# Patient Record
Sex: FEMALE | Race: BLACK OR AFRICAN AMERICAN | NOT HISPANIC OR LATINO
[De-identification: names, ages, dates, MRNs, and addresses within clinical notes are randomized per-mention and may not be internally consistent; named-entity substitution may affect disease eponyms.]

---

## 2021-04-08 PROBLEM — Z00.00 ENCOUNTER FOR PREVENTIVE HEALTH EXAMINATION: Status: ACTIVE | Noted: 2021-04-08

## 2021-04-10 ENCOUNTER — APPOINTMENT (OUTPATIENT)
Dept: MRI IMAGING | Facility: CLINIC | Age: 56
End: 2021-04-10

## 2021-04-10 ENCOUNTER — OUTPATIENT (OUTPATIENT)
Dept: OUTPATIENT SERVICES | Facility: HOSPITAL | Age: 56
LOS: 1 days | End: 2021-04-10
Payer: MEDICAID

## 2021-04-10 DIAGNOSIS — Z00.8 ENCOUNTER FOR OTHER GENERAL EXAMINATION: ICD-10-CM

## 2021-04-10 PROCEDURE — 70551 MRI BRAIN STEM W/O DYE: CPT

## 2021-04-10 PROCEDURE — 70551 MRI BRAIN STEM W/O DYE: CPT | Mod: 26

## 2021-07-14 ENCOUNTER — EMERGENCY (EMERGENCY)
Facility: HOSPITAL | Age: 56
LOS: 1 days | Discharge: HOME | End: 2021-07-14
Admitting: HOSPITALIST
Payer: MEDICAID

## 2021-07-14 VITALS
DIASTOLIC BLOOD PRESSURE: 61 MMHG | SYSTOLIC BLOOD PRESSURE: 128 MMHG | OXYGEN SATURATION: 100 % | HEART RATE: 69 BPM | RESPIRATION RATE: 14 BRPM

## 2021-07-14 VITALS
SYSTOLIC BLOOD PRESSURE: 144 MMHG | OXYGEN SATURATION: 99 % | HEART RATE: 88 BPM | DIASTOLIC BLOOD PRESSURE: 88 MMHG | RESPIRATION RATE: 16 BRPM | TEMPERATURE: 100 F

## 2021-07-14 LAB
ALBUMIN SERPL ELPH-MCNC: 4.5 G/DL — SIGNIFICANT CHANGE UP (ref 3.3–5)
ALP SERPL-CCNC: 53 U/L — SIGNIFICANT CHANGE UP (ref 40–120)
ALT FLD-CCNC: 22 U/L — SIGNIFICANT CHANGE UP (ref 4–33)
ANION GAP SERPL CALC-SCNC: 11 MMOL/L — SIGNIFICANT CHANGE UP (ref 7–14)
AST SERPL-CCNC: 22 U/L — SIGNIFICANT CHANGE UP (ref 4–32)
BASOPHILS # BLD AUTO: 0.01 K/UL — SIGNIFICANT CHANGE UP (ref 0–0.2)
BASOPHILS NFR BLD AUTO: 0.1 % — SIGNIFICANT CHANGE UP (ref 0–2)
BILIRUB SERPL-MCNC: <0.2 MG/DL — SIGNIFICANT CHANGE UP (ref 0.2–1.2)
BUN SERPL-MCNC: 11 MG/DL — SIGNIFICANT CHANGE UP (ref 7–23)
CALCIUM SERPL-MCNC: 9.5 MG/DL — SIGNIFICANT CHANGE UP (ref 8.4–10.5)
CHLORIDE SERPL-SCNC: 109 MMOL/L — HIGH (ref 98–107)
CO2 SERPL-SCNC: 21 MMOL/L — LOW (ref 22–31)
CREAT SERPL-MCNC: 0.73 MG/DL — SIGNIFICANT CHANGE UP (ref 0.5–1.3)
EOSINOPHIL # BLD AUTO: 0.01 K/UL — SIGNIFICANT CHANGE UP (ref 0–0.5)
EOSINOPHIL NFR BLD AUTO: 0.1 % — SIGNIFICANT CHANGE UP (ref 0–6)
GLUCOSE SERPL-MCNC: 113 MG/DL — HIGH (ref 70–99)
HCT VFR BLD CALC: 39.6 % — SIGNIFICANT CHANGE UP (ref 34.5–45)
HGB BLD-MCNC: 12.7 G/DL — SIGNIFICANT CHANGE UP (ref 11.5–15.5)
IANC: 7.11 K/UL — SIGNIFICANT CHANGE UP (ref 1.5–8.5)
IMM GRANULOCYTES NFR BLD AUTO: 0.3 % — SIGNIFICANT CHANGE UP (ref 0–1.5)
LYMPHOCYTES # BLD AUTO: 1.88 K/UL — SIGNIFICANT CHANGE UP (ref 1–3.3)
LYMPHOCYTES # BLD AUTO: 19.7 % — SIGNIFICANT CHANGE UP (ref 13–44)
MAGNESIUM SERPL-MCNC: 2.1 MG/DL — SIGNIFICANT CHANGE UP (ref 1.6–2.6)
MCHC RBC-ENTMCNC: 27.2 PG — SIGNIFICANT CHANGE UP (ref 27–34)
MCHC RBC-ENTMCNC: 32.1 GM/DL — SIGNIFICANT CHANGE UP (ref 32–36)
MCV RBC AUTO: 84.8 FL — SIGNIFICANT CHANGE UP (ref 80–100)
MONOCYTES # BLD AUTO: 0.49 K/UL — SIGNIFICANT CHANGE UP (ref 0–0.9)
MONOCYTES NFR BLD AUTO: 5.1 % — SIGNIFICANT CHANGE UP (ref 2–14)
NEUTROPHILS # BLD AUTO: 7.11 K/UL — SIGNIFICANT CHANGE UP (ref 1.8–7.4)
NEUTROPHILS NFR BLD AUTO: 74.7 % — SIGNIFICANT CHANGE UP (ref 43–77)
NRBC # BLD: 0 /100 WBCS — SIGNIFICANT CHANGE UP
NRBC # FLD: 0 K/UL — SIGNIFICANT CHANGE UP
PLATELET # BLD AUTO: 199 K/UL — SIGNIFICANT CHANGE UP (ref 150–400)
POTASSIUM SERPL-MCNC: 4 MMOL/L — SIGNIFICANT CHANGE UP (ref 3.5–5.3)
POTASSIUM SERPL-SCNC: 4 MMOL/L — SIGNIFICANT CHANGE UP (ref 3.5–5.3)
PROT SERPL-MCNC: 8.2 G/DL — SIGNIFICANT CHANGE UP (ref 6–8.3)
RBC # BLD: 4.67 M/UL — SIGNIFICANT CHANGE UP (ref 3.8–5.2)
RBC # FLD: 14.3 % — SIGNIFICANT CHANGE UP (ref 10.3–14.5)
SODIUM SERPL-SCNC: 141 MMOL/L — SIGNIFICANT CHANGE UP (ref 135–145)
WBC # BLD: 9.53 K/UL — SIGNIFICANT CHANGE UP (ref 3.8–10.5)
WBC # FLD AUTO: 9.53 K/UL — SIGNIFICANT CHANGE UP (ref 3.8–10.5)

## 2021-07-14 PROCEDURE — 99284 EMERGENCY DEPT VISIT MOD MDM: CPT

## 2021-07-14 RX ORDER — SODIUM CHLORIDE 9 MG/ML
1000 INJECTION INTRAMUSCULAR; INTRAVENOUS; SUBCUTANEOUS ONCE
Refills: 0 | Status: COMPLETED | OUTPATIENT
Start: 2021-07-14 | End: 2021-07-14

## 2021-07-14 RX ORDER — MECLIZINE HCL 12.5 MG
25 TABLET ORAL ONCE
Refills: 0 | Status: COMPLETED | OUTPATIENT
Start: 2021-07-14 | End: 2021-07-14

## 2021-07-14 RX ORDER — METOCLOPRAMIDE HCL 10 MG
10 TABLET ORAL ONCE
Refills: 0 | Status: COMPLETED | OUTPATIENT
Start: 2021-07-14 | End: 2021-07-14

## 2021-07-14 RX ADMIN — Medication 10 MILLIGRAM(S): at 21:46

## 2021-07-14 RX ADMIN — SODIUM CHLORIDE 1000 MILLILITER(S): 9 INJECTION INTRAMUSCULAR; INTRAVENOUS; SUBCUTANEOUS at 21:47

## 2021-07-14 RX ADMIN — Medication 25 MILLIGRAM(S): at 21:48

## 2021-07-14 NOTE — ED PROVIDER NOTE - CLINICAL SUMMARY MEDICAL DECISION MAKING FREE TEXT BOX
55 Y/O F w/ PMH of HTN presents to ER for vertigo.   Lab results within normal limits  Reports improvement fo symptoms following medications  Will refer to Neurology

## 2021-07-14 NOTE — ED PROVIDER NOTE - PATIENT PORTAL LINK FT
You can access the FollowMyHealth Patient Portal offered by [facility] by registering at the following website: [email or web address]. By joining [facility]’s FollowMyHealth portal, you will also be able to view your health information using other applications (apps) compatible with our system.

## 2021-07-14 NOTE — ED PROVIDER NOTE - NS ED ROS FT
Constitutional: (-) fever  Head: Normal cephalic, Atraumatic  Eyes/ENT: (-) vision changes  Cardiovascular: (-) chest pain, (-) wheezing  Respiratory: (-) cough, (-) shortness of breath  Gastrointestinal: (-) vomiting, (-) diarrhea, (-) abdominal pain  : (-) dysuria   Musculoskeletal: (-) back pain  Integumentary: (-) rash, (-) edema  Neurological: (-)loc (+) dizziness  Allergic/Immunologic: (-) pruritus

## 2021-07-14 NOTE — ED ADULT TRIAGE NOTE - CHIEF COMPLAINT QUOTE
Per EMS pt c/o dizziness that worsens with movement and is better when pt is still and her eyes are closed.  Pt with past medical history HTN, vertigo, DM2  by EMS

## 2021-07-14 NOTE — ED PROVIDER NOTE - PHYSICAL EXAMINATION
Vital signs reviewed.   CONSTITUTIONAL: Well-appearing; well-nourished; in no apparent distress. Non-toxic appearing.   HEAD: Normocephalic, atraumatic.  EYES: PERRL, EOM intact, conjunctiva and no sclera injection noted  ENT: normal nose; no rhinorrhea; normal pharynx with no tonsillar hypertrophy, no erythema, no exudate, no lymphadenopathy.  NECK/LYMPH: Supple; non-tender; no cervical lymphadenopathy.  CARD: Normal S1, S2  RESP: Normal chest excursion with respiration; breath sounds clear and equal bilaterally; no wheezes, rhonchi, or rales.  ABD/GI: soft, non-distended; non-tender; no palpable organomegaly, no pulsatile mass.  EXT/MS: moves all extremities; distal pulses are normal, no pedal edema.  SKIN: Normal for age and race; warm; dry; good turgor; no apparent lesions or exudate noted.  NEURO: Awake, alert, oriented x 3, no gross deficits, CN II-XII grossly intact, no motor or sensory deficit noted.  PSYCH: Normal mood; appropriate affect. Vital signs reviewed.   CONSTITUTIONAL: Well-appearing; well-nourished; in no apparent distress. Non-toxic appearing.   HEAD: Normocephalic, atraumatic.  EYES: PERRL, EOM intact, visual fields intact, normal conjunctiva and no sclera injection noted  ENT: normal nose; no rhinorrhea; normal pharynx with no tonsillar hypertrophy, no erythema, no exudate, no lymphadenopathy.  NECK/LYMPH: Supple; non-tender; no cervical lymphadenopathy.  CARD: Normal S1, S2  RESP: Normal chest excursion with respiration; breath sounds clear and equal bilaterally  ABD/GI: soft, non-distended; non-tender  EXT/MS: moves all extremities; distal pulses are normal, no pedal edema.  SKIN: Normal for age and race; warm; dry; good turgor; no apparent lesions or exudate noted.  NEURO: Awake, alert, oriented x 3, no gross deficits, CN II-XII grossly intact, no motor or sensory deficit noted. No pronator drift. Gait intact  PSYCH: Normal mood; appropriate affect.

## 2021-07-14 NOTE — ED PROVIDER NOTE - OBJECTIVE STATEMENT
55 Y/O F w/ PMH of HTN presents to ER for vertigo. Admits to onset of symptoms since 2008 when [facility] left [facility]. No different from previous episodes. States she was at work earlier today when she experienced room spinning and head burning sensation. Felt like she would pass out but symptoms resolved after sitting down. Seen multiple ER's for same complaint, multiple head scans. Last completed MRI of brain in May without acute findings. Prescribed Meclizine without relief. Denies fever, nausea/vomiting, visual/hearing change, chest pain, shortness of breath or syncope.

## 2021-07-15 RX ORDER — METOCLOPRAMIDE HCL 10 MG
1 TABLET ORAL
Qty: 9 | Refills: 0
Start: 2021-07-15 | End: 2021-07-17

## 2023-02-10 ENCOUNTER — TRANSCRIPTION ENCOUNTER (OUTPATIENT)
Age: 58
End: 2023-02-10

## 2023-02-10 ENCOUNTER — INPATIENT (INPATIENT)
Facility: HOSPITAL | Age: 58
LOS: 4 days | Discharge: NURSING, REHAB OR LONG-TERM CARE | End: 2023-02-15
Attending: ORTHOPAEDIC SURGERY | Admitting: ORTHOPAEDIC SURGERY
Payer: MEDICAID

## 2023-02-10 VITALS
TEMPERATURE: 98 F | OXYGEN SATURATION: 100 % | SYSTOLIC BLOOD PRESSURE: 141 MMHG | HEART RATE: 82 BPM | RESPIRATION RATE: 18 BRPM | DIASTOLIC BLOOD PRESSURE: 55 MMHG

## 2023-02-10 DIAGNOSIS — M54.9 DORSALGIA, UNSPECIFIED: ICD-10-CM

## 2023-02-10 LAB
ALBUMIN SERPL ELPH-MCNC: 4.3 G/DL — SIGNIFICANT CHANGE UP (ref 3.3–5)
ALP SERPL-CCNC: 42 U/L — SIGNIFICANT CHANGE UP (ref 40–120)
ALT FLD-CCNC: 20 U/L — SIGNIFICANT CHANGE UP (ref 4–33)
ANION GAP SERPL CALC-SCNC: 9 MMOL/L — SIGNIFICANT CHANGE UP (ref 7–14)
APTT BLD: 28.5 SEC — SIGNIFICANT CHANGE UP (ref 27–36.3)
AST SERPL-CCNC: 23 U/L — SIGNIFICANT CHANGE UP (ref 4–32)
BASOPHILS # BLD AUTO: 0.02 K/UL — SIGNIFICANT CHANGE UP (ref 0–0.2)
BASOPHILS NFR BLD AUTO: 0.3 % — SIGNIFICANT CHANGE UP (ref 0–2)
BILIRUB SERPL-MCNC: <0.2 MG/DL — SIGNIFICANT CHANGE UP (ref 0.2–1.2)
BLD GP AB SCN SERPL QL: NEGATIVE — SIGNIFICANT CHANGE UP
BUN SERPL-MCNC: 16 MG/DL — SIGNIFICANT CHANGE UP (ref 7–23)
CALCIUM SERPL-MCNC: 9.3 MG/DL — SIGNIFICANT CHANGE UP (ref 8.4–10.5)
CHLORIDE SERPL-SCNC: 108 MMOL/L — HIGH (ref 98–107)
CO2 SERPL-SCNC: 23 MMOL/L — SIGNIFICANT CHANGE UP (ref 22–31)
CREAT SERPL-MCNC: 0.73 MG/DL — SIGNIFICANT CHANGE UP (ref 0.5–1.3)
EGFR: 96 ML/MIN/1.73M2 — SIGNIFICANT CHANGE UP
EOSINOPHIL # BLD AUTO: 0.15 K/UL — SIGNIFICANT CHANGE UP (ref 0–0.5)
EOSINOPHIL NFR BLD AUTO: 2.6 % — SIGNIFICANT CHANGE UP (ref 0–6)
GLUCOSE SERPL-MCNC: 140 MG/DL — HIGH (ref 70–99)
HCT VFR BLD CALC: 37.7 % — SIGNIFICANT CHANGE UP (ref 34.5–45)
HGB BLD-MCNC: 11.8 G/DL — SIGNIFICANT CHANGE UP (ref 11.5–15.5)
IANC: 2.57 K/UL — SIGNIFICANT CHANGE UP (ref 1.8–7.4)
IMM GRANULOCYTES NFR BLD AUTO: 0.2 % — SIGNIFICANT CHANGE UP (ref 0–0.9)
INR BLD: 1.06 RATIO — SIGNIFICANT CHANGE UP (ref 0.88–1.16)
LYMPHOCYTES # BLD AUTO: 2.54 K/UL — SIGNIFICANT CHANGE UP (ref 1–3.3)
LYMPHOCYTES # BLD AUTO: 43.9 % — SIGNIFICANT CHANGE UP (ref 13–44)
MCHC RBC-ENTMCNC: 26.2 PG — LOW (ref 27–34)
MCHC RBC-ENTMCNC: 31.3 GM/DL — LOW (ref 32–36)
MCV RBC AUTO: 83.6 FL — SIGNIFICANT CHANGE UP (ref 80–100)
MONOCYTES # BLD AUTO: 0.5 K/UL — SIGNIFICANT CHANGE UP (ref 0–0.9)
MONOCYTES NFR BLD AUTO: 8.6 % — SIGNIFICANT CHANGE UP (ref 2–14)
NEUTROPHILS # BLD AUTO: 2.57 K/UL — SIGNIFICANT CHANGE UP (ref 1.8–7.4)
NEUTROPHILS NFR BLD AUTO: 44.4 % — SIGNIFICANT CHANGE UP (ref 43–77)
NRBC # BLD: 0 /100 WBCS — SIGNIFICANT CHANGE UP (ref 0–0)
NRBC # FLD: 0 K/UL — SIGNIFICANT CHANGE UP (ref 0–0)
PLATELET # BLD AUTO: 185 K/UL — SIGNIFICANT CHANGE UP (ref 150–400)
POTASSIUM SERPL-MCNC: 4.4 MMOL/L — SIGNIFICANT CHANGE UP (ref 3.5–5.3)
POTASSIUM SERPL-SCNC: 4.4 MMOL/L — SIGNIFICANT CHANGE UP (ref 3.5–5.3)
PROT SERPL-MCNC: 7.4 G/DL — SIGNIFICANT CHANGE UP (ref 6–8.3)
PROTHROM AB SERPL-ACNC: 12.3 SEC — SIGNIFICANT CHANGE UP (ref 10.5–13.4)
RBC # BLD: 4.51 M/UL — SIGNIFICANT CHANGE UP (ref 3.8–5.2)
RBC # FLD: 14.2 % — SIGNIFICANT CHANGE UP (ref 10.3–14.5)
RH IG SCN BLD-IMP: POSITIVE — SIGNIFICANT CHANGE UP
RH IG SCN BLD-IMP: POSITIVE — SIGNIFICANT CHANGE UP
SARS-COV-2 RNA SPEC QL NAA+PROBE: SIGNIFICANT CHANGE UP
SODIUM SERPL-SCNC: 140 MMOL/L — SIGNIFICANT CHANGE UP (ref 135–145)
WBC # BLD: 5.79 K/UL — SIGNIFICANT CHANGE UP (ref 3.8–10.5)
WBC # FLD AUTO: 5.79 K/UL — SIGNIFICANT CHANGE UP (ref 3.8–10.5)

## 2023-02-10 PROCEDURE — 99285 EMERGENCY DEPT VISIT HI MDM: CPT

## 2023-02-10 PROCEDURE — 99223 1ST HOSP IP/OBS HIGH 75: CPT

## 2023-02-10 PROCEDURE — 71046 X-RAY EXAM CHEST 2 VIEWS: CPT | Mod: 26

## 2023-02-10 RX ORDER — GABAPENTIN 400 MG/1
100 CAPSULE ORAL THREE TIMES A DAY
Refills: 0 | Status: DISCONTINUED | OUTPATIENT
Start: 2023-02-10 | End: 2023-02-15

## 2023-02-10 RX ORDER — LOSARTAN POTASSIUM 100 MG/1
100 TABLET, FILM COATED ORAL DAILY
Refills: 0 | Status: DISCONTINUED | OUTPATIENT
Start: 2023-02-10 | End: 2023-02-15

## 2023-02-10 RX ORDER — OXYCODONE HYDROCHLORIDE 5 MG/1
5 TABLET ORAL EVERY 4 HOURS
Refills: 0 | Status: DISCONTINUED | OUTPATIENT
Start: 2023-02-10 | End: 2023-02-15

## 2023-02-10 RX ORDER — PANTOPRAZOLE SODIUM 20 MG/1
40 TABLET, DELAYED RELEASE ORAL
Refills: 0 | Status: DISCONTINUED | OUTPATIENT
Start: 2023-02-10 | End: 2023-02-15

## 2023-02-10 RX ORDER — OXYCODONE HYDROCHLORIDE 5 MG/1
10 TABLET ORAL EVERY 4 HOURS
Refills: 0 | Status: DISCONTINUED | OUTPATIENT
Start: 2023-02-10 | End: 2023-02-15

## 2023-02-10 RX ORDER — SODIUM CHLORIDE 9 MG/ML
1000 INJECTION, SOLUTION INTRAVENOUS
Refills: 0 | Status: DISCONTINUED | OUTPATIENT
Start: 2023-02-10 | End: 2023-02-11

## 2023-02-10 RX ORDER — ACETAMINOPHEN 500 MG
975 TABLET ORAL EVERY 8 HOURS
Refills: 0 | Status: DISCONTINUED | OUTPATIENT
Start: 2023-02-10 | End: 2023-02-15

## 2023-02-10 RX ORDER — SODIUM CHLORIDE 9 MG/ML
1000 INJECTION INTRAMUSCULAR; INTRAVENOUS; SUBCUTANEOUS
Refills: 0 | Status: DISCONTINUED | OUTPATIENT
Start: 2023-02-10 | End: 2023-02-10

## 2023-02-10 RX ORDER — ACETAMINOPHEN 500 MG
650 TABLET ORAL ONCE
Refills: 0 | Status: COMPLETED | OUTPATIENT
Start: 2023-02-10 | End: 2023-02-10

## 2023-02-10 RX ADMIN — Medication 975 MILLIGRAM(S): at 15:29

## 2023-02-10 RX ADMIN — GABAPENTIN 100 MILLIGRAM(S): 400 CAPSULE ORAL at 13:14

## 2023-02-10 RX ADMIN — Medication 650 MILLIGRAM(S): at 10:30

## 2023-02-10 RX ADMIN — SODIUM CHLORIDE 75 MILLILITER(S): 9 INJECTION INTRAMUSCULAR; INTRAVENOUS; SUBCUTANEOUS at 12:38

## 2023-02-10 RX ADMIN — OXYCODONE HYDROCHLORIDE 10 MILLIGRAM(S): 5 TABLET ORAL at 21:52

## 2023-02-10 RX ADMIN — Medication 975 MILLIGRAM(S): at 21:21

## 2023-02-10 RX ADMIN — OXYCODONE HYDROCHLORIDE 10 MILLIGRAM(S): 5 TABLET ORAL at 21:21

## 2023-02-10 RX ADMIN — Medication 975 MILLIGRAM(S): at 21:52

## 2023-02-10 RX ADMIN — Medication 975 MILLIGRAM(S): at 13:14

## 2023-02-10 RX ADMIN — GABAPENTIN 100 MILLIGRAM(S): 400 CAPSULE ORAL at 21:22

## 2023-02-10 NOTE — H&P ADULT - ASSESSMENT
A/P: 58y Female with L3-4, 4-5 HND will plan for OR today vs tomorrow.   OR today or tomorrow w/ Dr Funk for L3-5 lami fusion  Pain control  WBAT with assistive devices as needed  NPO  Please obtain medical clearance for OR   A/P: 58y Female with L3-4, 4-5 HND will plan for OR today vs tomorrow.  Patient with severe spinal canal stenosis with spondylolisthesis and now has developed neurogenic bladder for the last few weeks with multiple episodes of loss of urine control.  Patient has neurogenic claudication with radiculopathy with partial weakness.  Goals of surgery discussed to preserve current function prior to further loss with hope of regaining some of the bladder function as well with decompression.  Both patient and her daughter fully understand the severity of her condition and need for surgery.   Patient has been managed by pain management for last 1 year.    OR today or tomorrow w/ Dr Funk for L3-5 lami fusion  Pain control  WBAT with assistive devices as needed  NPO  Please obtain medical clearance for OR

## 2023-02-10 NOTE — CHART NOTE - NSCHARTNOTEFT_GEN_A_CORE
EKG personally reviewed. Notable for sinus bradycardia to 56 with TWI noted in V1-V5. . Compared to previous EKG in 2021 had T-wave inversion previously in V1, V2, V3 and V6. TWI NEW IN V4 AND V5 Spoke with ortho resident, patient without any CP or SOB. Repeat EKG obtained showing stable T wave inversions. No concern for ACS at this time. Patient with no previous cardiac history.     Based on above results. RCRI Class 1 Low Risk, 30 day risk for MACE event 3.9%. Patient medically cleared for procedure.

## 2023-02-10 NOTE — H&P ADULT - ATTENDING COMMENTS
Risks, benefits and alternative have been discussed.  I have updated note to reflect current level of function.  All questions answered.

## 2023-02-10 NOTE — ED ADULT NURSE NOTE - OBJECTIVE STATEMENT
Pt is a 57 y/o female presenting to the ED with c/o lower back pain for the past 7 months. Pt denies any specific event of incident that precipitated the onset of pain. Pt c/o worsening limitations with mobility and ability to complete daily activities. Pt denies any urinary symptoms. Ongoing eval in progress, awaiting further orders.

## 2023-02-10 NOTE — CONSULT NOTE ADULT - ASSESSMENT
57 y/o F with pmhx of GERD, chronic LBP, neuropathy, HTN who presents with worsening lower back pain. Plan for OR with Dr. Funk for L3-L5 laminectomy and fusion.           #Lower Back pain   - worsening back pain for over 7 months, dicussed with ortho team patient had outpatient imaging done and was told to come to ED for possible surgery (not available in [facility] or [facility] per my review)   - plan for OE with L3-L5 laminectomy and fusion with orthopedic surgery   - denies any bowel or urinary incontinence  - CXR personally reviewed with clear lungs, no consolidations or effusions appreciated    - No EKG done in ED, ordered pending results  - medical clearance pending EKG        #Neuropathy   - on gabapentin 100mg TID for neuropathic pain  - would continue      #HTN  - BP within good range  - takes losartan 100mg qd   - would continue     #GERD  - c/w pantoprazole  59 y/o F with pmhx of GERD, chronic LBP, neuropathy, HTN who presents with worsening lower back pain. Plan for OR with Dr. Funk for L3-L5 laminectomy and fusion.       #Lower Back pain   - worsening back pain for over 7 months, dicussed with ortho team patient had outpatient imaging done and was told to come to ED for possible surgery (not available in [facility] or [facility] per my review)   - plan for OR with L3-L5 laminectomy and fusion with orthopedic surgery   - denies any bowel or urinary incontinence  - CXR personally reviewed with clear lungs, no consolidations or effusions appreciated    - No EKG done in ED, ordered pending results  - medical clearance pending EKG        #Neuropathy   - on gabapentin 100mg TID for neuropathic pain  - would continue      #HTN  - BP within good range  - takes losartan 100mg qd   - would continue     #GERD  - c/w pantoprazole

## 2023-02-10 NOTE — H&P ADULT - NSHPLABSRESULTS_GEN_ALL_CORE
Patient: KARINA ESTES  YOB: 1965  imaging:    Lumbar spine MRI and radiographs independently reviewed    EXAM:  MRI LUMBAR SPINE WITHOUT CONTRAST  HISTORY: Lower back pain.  TECHNIQUE: Multiplanar, multi-sequential MRI of the lumbar spine was obtained on a 3T scanner using a standard  protocol.  COMPARISON:  Lumbar spine radiographs 1/19/2023.  FINDINGS:  For purposes of this dictation, the last well-formed disc space will be labeled L5-S1.  OSSEOUS STRUCTURES: Vertebral body heights are preserved. No marrow edema or destructive marrow infiltrative  process.  ALIGNMENT: Normal lumbar lordosis is preserved. Grade 1 anterolisthesis of L4-5.  SPINAL CORD AND CONUS MEDULLARIS: The conus medullaris terminates at L1-2.  PARASPINAL AND INTRA-ABDOMINAL SOFT TISSUES: Unremarkable.  INCLUDED THORACIC SPINE AND SACRUM: Unremarkable.  DISCS: Disc heights are maintained. Disc desiccation from L3-4 to L5-S1.  The following axial levels are imaged and detailed below:  L1-L2: Disc bulge flattens the ventral thecal sac. No spinal canal or neuroforaminal stenosis.  L2-L3: Disc bulge flattens the ventral thecal sac. No spinal canal or neuroforaminal stenosis.  L3-L4: Disc bulge, ligamentum flavum thickening and facet hypertrophy with moderate spinal canal and mild bilateral  neuroforaminal stenosis.   L4-L5: Grade 1 anterolisthesis of L4-5, disc uncovering with annular fissure and facet hypertrophy with severe spinal  canal stenosis and mild bilateral neuroforaminal stenosis.  L5-S1: Disc bulge and facet hypertrophy with mild spinal canal and mild bilateral neuroforaminal stenosis.  IMPRESSION:  MRI of the lumbar spine demonstrates:  Grade 1 anterolisthesis of L4-5.  Multilevel lumbar spondylosis, most pronounced at L4-5, where there is severe spinal canal stenosis and mild bilateral  neuroforaminal stenosis.  Additional findings at each level, as above.  Thank you for the opportunity to participate in the care of this patient.  JOEY GRANGER CHI, MD - Electronically Signed: 02- 9:20 AM  Physician to Physician Direct Line is: [phone]

## 2023-02-10 NOTE — PATIENT PROFILE ADULT - FALL HARM RISK - HARM RISK INTERVENTIONS
Communicate Risk of Fall with Harm to all staff/Reinforce activity limits and safety measures with patient and family/Tailored Fall Risk Interventions/Visual Cue: Yellow wristband and red socks/Bed in lowest position, wheels locked, appropriate side rails in place/Call bell, personal items and telephone in reach/Instruct patient to call for assistance before getting out of bed or chair/Non-slip footwear when patient is out of bed/[place] to call system/Physically safe environment - no spills, clutter or unnecessary equipment/Purposeful Proactive Rounding/Room/bathroom lighting operational, light cord in reach Assistance with ambulation/Assistance OOB with selected safe patient handling equipment/Communicate Risk of Fall with Harm to all staff/Monitor for mental status changes/Monitor gait and stability/Reinforce activity limits and safety measures with patient and family/Reorient to person, place and time as needed/Review medications for side effects contributing to fall risk/Sit up slowly, dangle for a short time, stand at bedside before walking/Tailored Fall Risk Interventions/Toileting schedule using arm’s reach rule for commode and bathroom/Use of alarms - bed, chair and/or voice tab/Visual Cue: Yellow wristband and red socks/Bed in lowest position, wheels locked, appropriate side rails in place/Call bell, personal items and telephone in reach/Instruct patient to call for assistance before getting out of bed or chair/Non-slip footwear when patient is out of bed/[place] to call system/Physically safe environment - no spills, clutter or unnecessary equipment/Purposeful Proactive Rounding/Room/bathroom lighting operational, light cord in reach

## 2023-02-10 NOTE — ED ADULT NURSE NOTE - NSIMPLEMENTINTERV_GEN_ALL_ED
Implemented All Fall Risk Interventions:  [place] to call system. Call bell, personal items and telephone within reach. Instruct patient to call for assistance. Room bathroom lighting operational. Non-slip footwear when patient is off stretcher. Physically safe environment: no spills, clutter or unnecessary equipment. Stretcher in lowest position, wheels locked, appropriate side rails in place. Provide visual cue, wrist band, yellow gown, etc. Monitor gait and stability. Monitor for mental status changes and reorient to person, place, and time. Review medications for side effects contributing to fall risk. Reinforce activity limits and safety measures with patient and family.

## 2023-02-10 NOTE — H&P ADULT - NSHPPHYSICALEXAM_GEN_ALL_CORE
Physical exam  Gen: NAD  Resp: no increased WOB on RA  Spine PE:  Skin intact  No gross deformity  No midnline TTP C/T/L/S spine  No bony step offs  No paraspinal muscle ttp/hypertonicity   Negative clonus  Negative babinski  Negative butterfield  + rectal tone  No saddle anesthesia    Motor:                   C5                C6              C7               C8           T1   R            5/5                5/5            5/5             5/5          5/5  L             5/5               5/5             5/5             5/5          5/5                L2             L3             L4               L5            S1  R         5/5           5/5          5/5             5/5           5/5  L          5/5          5/5           5/5             5/5           5/5    Sensory:            C5         C6         C7      C8       T1        (0=absent, 1=impaired, 2=normal, NT=not testable)  R         2            2           2        2         2  L          2            2           2        2         2               L2          L3         L4      L5       S1         (0=absent, 1=impaired, 2=normal, NT=not testable)  R         2            2            2        2        2  L          2            2           2        2         2    Imaging: Physical exam  Gen: NAD  Resp: no increased WOB on RA  Spine PE:  Skin intact  No gross deformity  No midnline TTP C/T/L/S spine  No bony step offs  No paraspinal muscle ttp/hypertonicity   Negative clonus  Negative babinski  Negative butterfield  + rectal tone  No saddle anesthesia    Motor:                   C5                C6              C7               C8           T1   R            5/5                5/5            5/5             5/5          5/5  L             5/5               5/5             5/5             5/5          5/5                L2             L3             L4               L5            S1  R         5/5           4+/5          4+/5             4+/5           5/5  L          5/5          4+/5           4+/5             4+/5           5/5    * on single leg stance patient is not able to maintain position.  needs support of table, not able to perform toe rise independently without support  (+) SLR bilateral  (+) TTP over lumbar spine    Sensory:            C5         C6         C7      C8       T1        (0=absent, 1=impaired, 2=normal, NT=not testable)  R         2            2           2        2         2  L          2            2           2        2         2               L2          L3         L4      L5       S1         (0=absent, 1=impaired, 2=normal, NT=not testable)  R         2            2           1        1        2  L          2            2           1        1         2    Imaging:    Lumbar spine MRI and radiographs independently reviewed

## 2023-02-10 NOTE — H&P ADULT - HISTORY OF PRESENT ILLNESS
Orthopedic Spine Consult Note    Patient is a 58y Female who presents with 2 years of back pain. Reports pain radiated down legs bilaterally. Also reports 7 mo urinary incontince. Describes feeling urge to urinate but unable to make it to toilet frequently. Denies numbness or paresthesia bialterally. No recent falls or trama. . Denies HS/LOC. Denies pain/injury elsewhere. . Denies bowel incontinence. No saddle anesthesia. Denies fevers/chills. No other complaints at this time.    HEALTH ISSUES - PROBLEM Dx:          MEDICATIONS  (STANDING):  acetaminophen     Tablet .. 650 milliGRAM(s) Oral once      Allergies    No Known Allergies    Intolerances        PAST MEDICAL & SURGICAL HISTORY:  Arthritis    No significant past surgical history                            Vital Signs Last 24 Hrs  T(C): 36.6 (02-10-23 @ 10:22), Max: 36.6 (02-10-23 @ 08:56)  T(F): 97.9 (02-10-23 @ 10:22), Max: 97.9 (02-10-23 @ 08:56)  HR: 82 (02-10-23 @ 10:22) (82 - 82)  BP: 147/76 (02-10-23 @ 10:22) (141/55 - 147/76)  BP(mean): --  RR: 18 (02-10-23 @ 10:22) (18 - 18)  SpO2: 100% (02-10-23 @ 10:22) (100% - 100%)   Orthopedic Spine Consult Note    Patient is a 58y Female who presents with 2 years of back pain. Reports pain radiated down legs bilaterally. Patient had urinary urgency for last few months but in the last two weeks, describes feeling urge to urinate but unable to make it to toilet frequently. Denies numbness or paresthesia bialterally. No recent falls or trama. . Denies HS/LOC. Denies pain/injury elsewhere. . Denies bowel incontinence. No saddle anesthesia. Denies fevers/chills. No other complaints at this time.  Daughter reports patient was in denial and begin to use diaper to avoid incontinence that started in last two weeks.    HEALTH ISSUES - PROBLEM Dx:          MEDICATIONS  (STANDING):  acetaminophen     Tablet .. 650 milliGRAM(s) Oral once      Allergies    No Known Allergies    Intolerances        PAST MEDICAL & SURGICAL HISTORY:  Arthritis    No significant past surgical history                            Vital Signs Last 24 Hrs  T(C): 36.6 (02-10-23 @ 10:22), Max: 36.6 (02-10-23 @ 08:56)  T(F): 97.9 (02-10-23 @ 10:22), Max: 97.9 (02-10-23 @ 08:56)  HR: 82 (02-10-23 @ 10:22) (82 - 82)  BP: 147/76 (02-10-23 @ 10:22) (141/55 - 147/76)  BP(mean): --  RR: 18 (02-10-23 @ 10:22) (18 - 18)  SpO2: 100% (02-10-23 @ 10:22) (100% - 100%)

## 2023-02-10 NOTE — ED ADULT TRIAGE NOTE - CHIEF COMPLAINT QUOTE
Pt states has been having middle lower back  pain  x 7 months. pt also states has been having frequent urination since October.  Pt states seen by doctor and told there where no abnormal findings.  Pt denies fever or chills.

## 2023-02-10 NOTE — ED PROVIDER NOTE - PHYSICAL EXAMINATION
CONSTITUTIONAL: Non-toxic, non-diaphoretic, in no apparent distress  HEAD: Normocephalic; atraumatic  EYES: EOM intact   ENMT: External appears normal; normal oropharynx, moist  NECK: grossly normal active ROM,  CARD: No cyanosis, good peripheral perfusion,   RESP: Normal chest excursion with respiration; no increased work of breathing  ABD: non-distended   EXT: moving all extremities, no gross disfigurement or asymmetry,  SKIN: Warm, dry, no rash  NEURO:  moving all extremities, no facial droop, no dysarthria      cn2-12 intact  5/5 all extremities

## 2023-02-10 NOTE — ED PROVIDER NOTE - OBJECTIVE STATEMENT
Patient is a 58-year-old female past medical history of hypertension is presenting to the emergency department for chronic back pain.  Has been going on for months.  Patient is well-known to the orthopedic team.  Plan for admission is for lumbar spinal surgery.

## 2023-02-10 NOTE — CHART NOTE - NSCHARTNOTEFT_GEN_A_CORE
Case d/w orthopedics resident.  Pt with spinal stenosis now with worsening low back pain and urinary incontinence with plan for lumbar lami/fusion tomorrow AM.  Pt was noted on admission EKG to have TWI in anterior leads, however no chest pain or other concerning symptoms.  Original and repeat EKG's reviewed and are unchanged.      No evidence of acute cardiac ischemia.  No further testing is indicated prior to OR.

## 2023-02-10 NOTE — ED PROVIDER NOTE - CLINICAL SUMMARY MEDICAL DECISION MAKING FREE TEXT BOX
Patient is a 58-year-old female past medical history of hypertension is presenting to the emergency department for chronic back pain.  Has been going on for months.  Patient is well-known to the orthopedic team.  Plan for admission is for lumbar spinal surgery.  Patient is well-appearing, vital signs are within normal limits.  Patient has 5 out of 5 strength in bilateral lower extremities.  Ambulatory with antalgic gait.  Will obtain preoperative screening labs Ortho is already aware and consulted for the patient and plan for admission for OR tonight or tomorrow.  The patient's condition was not amenable to outpatient treatment due either the lack of feasibility of outpatient care coordination, possibility for further decompensation with adverse outcome if discharge, or treatments and diagnostic  modalities only available during an inpatient hospitalization.

## 2023-02-11 ENCOUNTER — TRANSCRIPTION ENCOUNTER (OUTPATIENT)
Age: 58
End: 2023-02-11

## 2023-02-11 DIAGNOSIS — K21.9 GASTRO-ESOPHAGEAL REFLUX DISEASE WITHOUT ESOPHAGITIS: ICD-10-CM

## 2023-02-11 DIAGNOSIS — M54.50 LOW BACK PAIN, UNSPECIFIED: ICD-10-CM

## 2023-02-11 DIAGNOSIS — M48.061 SPINAL STENOSIS, LUMBAR REGION WITHOUT NEUROGENIC CLAUDICATION: ICD-10-CM

## 2023-02-11 DIAGNOSIS — I10 ESSENTIAL (PRIMARY) HYPERTENSION: ICD-10-CM

## 2023-02-11 DIAGNOSIS — G62.9 POLYNEUROPATHY, UNSPECIFIED: ICD-10-CM

## 2023-02-11 LAB
ANION GAP SERPL CALC-SCNC: 10 MMOL/L — SIGNIFICANT CHANGE UP (ref 7–14)
ANION GAP SERPL CALC-SCNC: 10 MMOL/L — SIGNIFICANT CHANGE UP (ref 7–14)
APTT BLD: 28.8 SEC — SIGNIFICANT CHANGE UP (ref 27–36.3)
BASOPHILS # BLD AUTO: 0.01 K/UL — SIGNIFICANT CHANGE UP (ref 0–0.2)
BASOPHILS # BLD AUTO: 0.01 K/UL — SIGNIFICANT CHANGE UP (ref 0–0.2)
BASOPHILS NFR BLD AUTO: 0.1 % — SIGNIFICANT CHANGE UP (ref 0–2)
BASOPHILS NFR BLD AUTO: 0.2 % — SIGNIFICANT CHANGE UP (ref 0–2)
BUN SERPL-MCNC: 15 MG/DL — SIGNIFICANT CHANGE UP (ref 7–23)
BUN SERPL-MCNC: 16 MG/DL — SIGNIFICANT CHANGE UP (ref 7–23)
CALCIUM SERPL-MCNC: 8.9 MG/DL — SIGNIFICANT CHANGE UP (ref 8.4–10.5)
CALCIUM SERPL-MCNC: 9.4 MG/DL — SIGNIFICANT CHANGE UP (ref 8.4–10.5)
CHLORIDE SERPL-SCNC: 105 MMOL/L — SIGNIFICANT CHANGE UP (ref 98–107)
CHLORIDE SERPL-SCNC: 106 MMOL/L — SIGNIFICANT CHANGE UP (ref 98–107)
CO2 SERPL-SCNC: 21 MMOL/L — LOW (ref 22–31)
CO2 SERPL-SCNC: 23 MMOL/L — SIGNIFICANT CHANGE UP (ref 22–31)
CREAT SERPL-MCNC: 0.6 MG/DL — SIGNIFICANT CHANGE UP (ref 0.5–1.3)
CREAT SERPL-MCNC: 0.81 MG/DL — SIGNIFICANT CHANGE UP (ref 0.5–1.3)
EGFR: 105 ML/MIN/1.73M2 — SIGNIFICANT CHANGE UP
EGFR: 85 ML/MIN/1.73M2 — SIGNIFICANT CHANGE UP
EOSINOPHIL # BLD AUTO: 0 K/UL — SIGNIFICANT CHANGE UP (ref 0–0.5)
EOSINOPHIL # BLD AUTO: 0.25 K/UL — SIGNIFICANT CHANGE UP (ref 0–0.5)
EOSINOPHIL NFR BLD AUTO: 0 % — SIGNIFICANT CHANGE UP (ref 0–6)
EOSINOPHIL NFR BLD AUTO: 4.8 % — SIGNIFICANT CHANGE UP (ref 0–6)
GAS PNL BLDA: SIGNIFICANT CHANGE UP
GLUCOSE SERPL-MCNC: 177 MG/DL — HIGH (ref 70–99)
GLUCOSE SERPL-MCNC: 99 MG/DL — SIGNIFICANT CHANGE UP (ref 70–99)
HCG SERPL-ACNC: <5 MIU/ML — SIGNIFICANT CHANGE UP
HCT VFR BLD CALC: 33.4 % — LOW (ref 34.5–45)
HCT VFR BLD CALC: 37.3 % — SIGNIFICANT CHANGE UP (ref 34.5–45)
HGB BLD-MCNC: 10.8 G/DL — LOW (ref 11.5–15.5)
HGB BLD-MCNC: 11.5 G/DL — SIGNIFICANT CHANGE UP (ref 11.5–15.5)
IANC: 1.85 K/UL — SIGNIFICANT CHANGE UP (ref 1.8–7.4)
IANC: 6.9 K/UL — SIGNIFICANT CHANGE UP (ref 1.8–7.4)
IMM GRANULOCYTES NFR BLD AUTO: 0.2 % — SIGNIFICANT CHANGE UP (ref 0–0.9)
IMM GRANULOCYTES NFR BLD AUTO: 0.4 % — SIGNIFICANT CHANGE UP (ref 0–0.9)
INR BLD: 1.1 RATIO — SIGNIFICANT CHANGE UP (ref 0.88–1.16)
LYMPHOCYTES # BLD AUTO: 1.15 K/UL — SIGNIFICANT CHANGE UP (ref 1–3.3)
LYMPHOCYTES # BLD AUTO: 14.1 % — SIGNIFICANT CHANGE UP (ref 13–44)
LYMPHOCYTES # BLD AUTO: 2.62 K/UL — SIGNIFICANT CHANGE UP (ref 1–3.3)
LYMPHOCYTES # BLD AUTO: 49.9 % — HIGH (ref 13–44)
MCHC RBC-ENTMCNC: 26 PG — LOW (ref 27–34)
MCHC RBC-ENTMCNC: 27.1 PG — SIGNIFICANT CHANGE UP (ref 27–34)
MCHC RBC-ENTMCNC: 30.8 GM/DL — LOW (ref 32–36)
MCHC RBC-ENTMCNC: 32.3 GM/DL — SIGNIFICANT CHANGE UP (ref 32–36)
MCV RBC AUTO: 83.9 FL — SIGNIFICANT CHANGE UP (ref 80–100)
MCV RBC AUTO: 84.2 FL — SIGNIFICANT CHANGE UP (ref 80–100)
MONOCYTES # BLD AUTO: 0.09 K/UL — SIGNIFICANT CHANGE UP (ref 0–0.9)
MONOCYTES # BLD AUTO: 0.51 K/UL — SIGNIFICANT CHANGE UP (ref 0–0.9)
MONOCYTES NFR BLD AUTO: 1.1 % — LOW (ref 2–14)
MONOCYTES NFR BLD AUTO: 9.7 % — SIGNIFICANT CHANGE UP (ref 2–14)
NEUTROPHILS # BLD AUTO: 1.85 K/UL — SIGNIFICANT CHANGE UP (ref 1.8–7.4)
NEUTROPHILS # BLD AUTO: 6.9 K/UL — SIGNIFICANT CHANGE UP (ref 1.8–7.4)
NEUTROPHILS NFR BLD AUTO: 35.2 % — LOW (ref 43–77)
NEUTROPHILS NFR BLD AUTO: 84.3 % — HIGH (ref 43–77)
NRBC # BLD: 0 /100 WBCS — SIGNIFICANT CHANGE UP (ref 0–0)
NRBC # BLD: 0 /100 WBCS — SIGNIFICANT CHANGE UP (ref 0–0)
NRBC # FLD: 0 K/UL — SIGNIFICANT CHANGE UP (ref 0–0)
NRBC # FLD: 0 K/UL — SIGNIFICANT CHANGE UP (ref 0–0)
PLATELET # BLD AUTO: 165 K/UL — SIGNIFICANT CHANGE UP (ref 150–400)
PLATELET # BLD AUTO: 182 K/UL — SIGNIFICANT CHANGE UP (ref 150–400)
POTASSIUM SERPL-MCNC: 3.8 MMOL/L — SIGNIFICANT CHANGE UP (ref 3.5–5.3)
POTASSIUM SERPL-MCNC: 3.9 MMOL/L — SIGNIFICANT CHANGE UP (ref 3.5–5.3)
POTASSIUM SERPL-SCNC: 3.8 MMOL/L — SIGNIFICANT CHANGE UP (ref 3.5–5.3)
POTASSIUM SERPL-SCNC: 3.9 MMOL/L — SIGNIFICANT CHANGE UP (ref 3.5–5.3)
PROTHROM AB SERPL-ACNC: 12.8 SEC — SIGNIFICANT CHANGE UP (ref 10.5–13.4)
RBC # BLD: 3.98 M/UL — SIGNIFICANT CHANGE UP (ref 3.8–5.2)
RBC # BLD: 4.43 M/UL — SIGNIFICANT CHANGE UP (ref 3.8–5.2)
RBC # FLD: 14.4 % — SIGNIFICANT CHANGE UP (ref 10.3–14.5)
RBC # FLD: 14.5 % — SIGNIFICANT CHANGE UP (ref 10.3–14.5)
SODIUM SERPL-SCNC: 136 MMOL/L — SIGNIFICANT CHANGE UP (ref 135–145)
SODIUM SERPL-SCNC: 139 MMOL/L — SIGNIFICANT CHANGE UP (ref 135–145)
WBC # BLD: 5.25 K/UL — SIGNIFICANT CHANGE UP (ref 3.8–10.5)
WBC # BLD: 8.18 K/UL — SIGNIFICANT CHANGE UP (ref 3.8–10.5)
WBC # FLD AUTO: 5.25 K/UL — SIGNIFICANT CHANGE UP (ref 3.8–10.5)
WBC # FLD AUTO: 8.18 K/UL — SIGNIFICANT CHANGE UP (ref 3.8–10.5)

## 2023-02-11 PROCEDURE — 88304 TISSUE EXAM BY PATHOLOGIST: CPT | Mod: 26

## 2023-02-11 DEVICE — ROD 70MM: Type: IMPLANTABLE DEVICE | Status: FUNCTIONAL

## 2023-02-11 DEVICE — SCREW SERRATO 5.5X45MM: Type: IMPLANTABLE DEVICE | Status: FUNCTIONAL

## 2023-02-11 DEVICE — GRAFT BONE INFUSE KIT MED: Type: IMPLANTABLE DEVICE | Status: FUNCTIONAL

## 2023-02-11 DEVICE — IMPLANTABLE DEVICE: Type: IMPLANTABLE DEVICE | Status: FUNCTIONAL

## 2023-02-11 DEVICE — BONE WAX 2.5GM: Type: IMPLANTABLE DEVICE | Status: FUNCTIONAL

## 2023-02-11 DEVICE — GRAFT BONE VITOSS BIMODAL 5CC: Type: IMPLANTABLE DEVICE | Status: FUNCTIONAL

## 2023-02-11 DEVICE — GRAFT VITOSIS BIMODAL 10CC: Type: IMPLANTABLE DEVICE | Status: FUNCTIONAL

## 2023-02-11 DEVICE — SURGIFLO MATRIX WITH THROMBIN KIT: Type: IMPLANTABLE DEVICE | Status: FUNCTIONAL

## 2023-02-11 DEVICE — SCREW BLOCKER BONE XIA: Type: IMPLANTABLE DEVICE | Status: FUNCTIONAL

## 2023-02-11 DEVICE — SURGIFOAM PAD 8CM X 12.5CM X 2MM (100C): Type: IMPLANTABLE DEVICE | Status: FUNCTIONAL

## 2023-02-11 RX ORDER — OXYCODONE HYDROCHLORIDE 5 MG/1
5 TABLET ORAL EVERY 4 HOURS
Refills: 0 | Status: DISCONTINUED | OUTPATIENT
Start: 2023-02-11 | End: 2023-02-12

## 2023-02-11 RX ORDER — ONDANSETRON 8 MG/1
4 TABLET, FILM COATED ORAL ONCE
Refills: 0 | Status: DISCONTINUED | OUTPATIENT
Start: 2023-02-11 | End: 2023-02-11

## 2023-02-11 RX ORDER — FENTANYL CITRATE 50 UG/ML
25 INJECTION INTRAVENOUS
Refills: 0 | Status: DISCONTINUED | OUTPATIENT
Start: 2023-02-11 | End: 2023-02-11

## 2023-02-11 RX ORDER — DIAZEPAM 5 MG
5 TABLET ORAL ONCE
Refills: 0 | Status: DISCONTINUED | OUTPATIENT
Start: 2023-02-11 | End: 2023-02-11

## 2023-02-11 RX ORDER — CEFAZOLIN SODIUM 1 G
2000 VIAL (EA) INJECTION EVERY 8 HOURS
Refills: 0 | Status: COMPLETED | OUTPATIENT
Start: 2023-02-11 | End: 2023-02-12

## 2023-02-11 RX ORDER — HYDROMORPHONE HYDROCHLORIDE 2 MG/ML
0.5 INJECTION INTRAMUSCULAR; INTRAVENOUS; SUBCUTANEOUS
Refills: 0 | Status: DISCONTINUED | OUTPATIENT
Start: 2023-02-11 | End: 2023-02-12

## 2023-02-11 RX ORDER — TRAMADOL HYDROCHLORIDE 50 MG/1
50 TABLET ORAL EVERY 6 HOURS
Refills: 0 | Status: DISCONTINUED | OUTPATIENT
Start: 2023-02-11 | End: 2023-02-15

## 2023-02-11 RX ORDER — ACETAMINOPHEN 500 MG
1000 TABLET ORAL ONCE
Refills: 0 | Status: DISCONTINUED | OUTPATIENT
Start: 2023-02-11 | End: 2023-02-12

## 2023-02-11 RX ORDER — OXYCODONE HYDROCHLORIDE 5 MG/1
10 TABLET ORAL EVERY 4 HOURS
Refills: 0 | Status: DISCONTINUED | OUTPATIENT
Start: 2023-02-11 | End: 2023-02-12

## 2023-02-11 RX ORDER — HYDROMORPHONE HYDROCHLORIDE 2 MG/ML
0.5 INJECTION INTRAMUSCULAR; INTRAVENOUS; SUBCUTANEOUS EVERY 8 HOURS
Refills: 0 | Status: DISCONTINUED | OUTPATIENT
Start: 2023-02-11 | End: 2023-02-15

## 2023-02-11 RX ADMIN — Medication 975 MILLIGRAM(S): at 05:44

## 2023-02-11 RX ADMIN — HYDROMORPHONE HYDROCHLORIDE 0.5 MILLIGRAM(S): 2 INJECTION INTRAMUSCULAR; INTRAVENOUS; SUBCUTANEOUS at 16:30

## 2023-02-11 RX ADMIN — GABAPENTIN 100 MILLIGRAM(S): 400 CAPSULE ORAL at 05:44

## 2023-02-11 RX ADMIN — LOSARTAN POTASSIUM 100 MILLIGRAM(S): 100 TABLET, FILM COATED ORAL at 05:44

## 2023-02-11 RX ADMIN — HYDROMORPHONE HYDROCHLORIDE 0.5 MILLIGRAM(S): 2 INJECTION INTRAMUSCULAR; INTRAVENOUS; SUBCUTANEOUS at 15:36

## 2023-02-11 RX ADMIN — Medication 5 MILLIGRAM(S): at 16:22

## 2023-02-11 RX ADMIN — OXYCODONE HYDROCHLORIDE 10 MILLIGRAM(S): 5 TABLET ORAL at 18:27

## 2023-02-11 RX ADMIN — Medication 975 MILLIGRAM(S): at 06:44

## 2023-02-11 RX ADMIN — HYDROMORPHONE HYDROCHLORIDE 0.5 MILLIGRAM(S): 2 INJECTION INTRAMUSCULAR; INTRAVENOUS; SUBCUTANEOUS at 15:56

## 2023-02-11 RX ADMIN — Medication 100 MILLIGRAM(S): at 21:36

## 2023-02-11 RX ADMIN — Medication 975 MILLIGRAM(S): at 22:26

## 2023-02-11 RX ADMIN — HYDROMORPHONE HYDROCHLORIDE 0.5 MILLIGRAM(S): 2 INJECTION INTRAMUSCULAR; INTRAVENOUS; SUBCUTANEOUS at 18:15

## 2023-02-11 RX ADMIN — SODIUM CHLORIDE 75 MILLILITER(S): 9 INJECTION, SOLUTION INTRAVENOUS at 21:00

## 2023-02-11 RX ADMIN — HYDROMORPHONE HYDROCHLORIDE 0.5 MILLIGRAM(S): 2 INJECTION INTRAMUSCULAR; INTRAVENOUS; SUBCUTANEOUS at 15:50

## 2023-02-11 RX ADMIN — OXYCODONE HYDROCHLORIDE 10 MILLIGRAM(S): 5 TABLET ORAL at 19:00

## 2023-02-11 RX ADMIN — Medication 975 MILLIGRAM(S): at 21:33

## 2023-02-11 RX ADMIN — GABAPENTIN 100 MILLIGRAM(S): 400 CAPSULE ORAL at 21:34

## 2023-02-11 RX ADMIN — HYDROMORPHONE HYDROCHLORIDE 0.5 MILLIGRAM(S): 2 INJECTION INTRAMUSCULAR; INTRAVENOUS; SUBCUTANEOUS at 17:56

## 2023-02-11 RX ADMIN — PANTOPRAZOLE SODIUM 40 MILLIGRAM(S): 20 TABLET, DELAYED RELEASE ORAL at 05:45

## 2023-02-11 NOTE — BRIEF OPERATIVE NOTE - NSICDXBRIEFPROCEDURE_GEN_ALL_CORE_FT
PROCEDURES:  Fusion, spine, lumbar, interbody, 1-2 levels, transforaminal approach 11-Feb-2023 15:15:08  Teto Kirk

## 2023-02-11 NOTE — PRE-OP CHECKLIST - SELECT TESTS ORDERED
BMP/CBC/PT/PTT/INR/Type and Screen GLU- 99/BMP/CBC/PT/PTT/INR/Type and Cross/Type and Screen/POCT Blood Glucose/COVID-19

## 2023-02-11 NOTE — PROGRESS NOTE ADULT - SUBJECTIVE AND OBJECTIVE BOX
ORTHO PROGRESS NOTE     No acute overnight events. Pt resting comfortably without complaint. Pain controlled       Vital Signs Last 24 Hrs  T(C): 36.9 (11 Feb 2023 06:02), Max: 36.9 (10 Feb 2023 12:00)  T(F): 98.5 (11 Feb 2023 06:02), Max: 98.5 (10 Feb 2023 12:00)  HR: 62 (11 Feb 2023 06:02) (62 - 105)  BP: 132/70 (11 Feb 2023 06:02) (132/70 - 160/78)  BP(mean): --  RR: 16 (11 Feb 2023 06:02) (16 - 18)  SpO2: 99% (11 Feb 2023 06:02) (99% - 100%)    Parameters below as of 11 Feb 2023 06:02  Patient On (Oxygen Delivery Method): room air        Back:   Motor:                   C5                C6              C7               C8           T1   R            5/5                5/5            5/5             5/5          5/5  L             5/5               5/5             5/5             5/5          5/5                L2             L3             L4               L5            S1  R         5/5           5/5          5/5             5/5           5/5  L          5/5          5/5           5/5             5/5           5/5    Sensory:            C5         C6         C7      C8       T1        (0=absent, 1=impaired, 2=normal, NT=not testable)  R         2            2           2        2         2  L          2            2           2        2         2               L2          L3         L4      L5       S1         (0=absent, 1=impaired, 2=normal, NT=not testable)  R         2            2            2        2        2  L          2            2           2        2         2      A/P: 58y Female with L3-4, 4-5 HND will plan for OR today    OR today  w/ Dr Funk for L3-5 lami fusion  Pain control  WBAT with assistive devices as needed  NPO  Appreciate medical clearance

## 2023-02-11 NOTE — BRIEF OPERATIVE NOTE - NSICDXBRIEFPREOP_GEN_ALL_CORE_FT
PRE-OP DIAGNOSIS:  Lumbar stenosis without neurogenic claudication 11-Feb-2023 15:15:36  Teto Kirk

## 2023-02-11 NOTE — BRIEF OPERATIVE NOTE - NSICDXBRIEFPOSTOP_GEN_ALL_CORE_FT
POST-OP DIAGNOSIS:  Lumbar stenosis without neurogenic claudication 11-Feb-2023 15:15:52  Teto Kirk

## 2023-02-11 NOTE — PROGRESS NOTE ADULT - SUBJECTIVE AND OBJECTIVE BOX
Patient Resting without complaints s/p l3-5 lami/fusion.  No Chest Pain, SOB, N/V.     Exam:   Gen: Alert/Oriented, No Acute Distress  Pulm: Non-labored breathing  BACK:          Dressing: [x] clean/dry/intact  [ ] Other:           Drains: : CHACHO SS         Sensation: [x] intact to light touch  [ ] decreased:          Motor exam: [  ]               [ ] Lower extremeity            PF          DF         EHL       FHL                                                                                            R        5/5        5/5        5/5       5/5                                                        L         5/5        5/5        5/5       5/5                                                                  Calves Soft/Non-tender bilaterally           WWP; capillary refill <3 seconds              LABS:                        10.8<L>  8.18  )-----------( 165      ( 11 Feb 2023 16:21 )             33.4<L>    02-11    136  |  105  |  15  ----------------------------<  177<H>  3.9   |  21<L>  |  0.60      Assessment and Plan:  Pt is a  59 y/o  Female  s/p L3-5 lami/fusion. Patient is hemodynamically stable; recovering well from orhtopaedic standpoint.    -    Multimodal Pain control  -    mechanical DVT ppx  -    Resumed home meds  -    Check AM labs  -    Monitor CHACHO output  -    Weight bearing status: WBAT  -    PT/OT  -    Dispo planning

## 2023-02-12 LAB
ANION GAP SERPL CALC-SCNC: 9 MMOL/L — SIGNIFICANT CHANGE UP (ref 7–14)
BASOPHILS # BLD AUTO: 0.01 K/UL — SIGNIFICANT CHANGE UP (ref 0–0.2)
BASOPHILS NFR BLD AUTO: 0.1 % — SIGNIFICANT CHANGE UP (ref 0–2)
BUN SERPL-MCNC: 15 MG/DL — SIGNIFICANT CHANGE UP (ref 7–23)
CALCIUM SERPL-MCNC: 8.9 MG/DL — SIGNIFICANT CHANGE UP (ref 8.4–10.5)
CHLORIDE SERPL-SCNC: 105 MMOL/L — SIGNIFICANT CHANGE UP (ref 98–107)
CO2 SERPL-SCNC: 24 MMOL/L — SIGNIFICANT CHANGE UP (ref 22–31)
CREAT SERPL-MCNC: 0.79 MG/DL — SIGNIFICANT CHANGE UP (ref 0.5–1.3)
EGFR: 88 ML/MIN/1.73M2 — SIGNIFICANT CHANGE UP
EOSINOPHIL # BLD AUTO: 0 K/UL — SIGNIFICANT CHANGE UP (ref 0–0.5)
EOSINOPHIL NFR BLD AUTO: 0 % — SIGNIFICANT CHANGE UP (ref 0–6)
GLUCOSE SERPL-MCNC: 104 MG/DL — HIGH (ref 70–99)
HCT VFR BLD CALC: 30.5 % — LOW (ref 34.5–45)
HGB BLD-MCNC: 9.7 G/DL — LOW (ref 11.5–15.5)
IANC: 7.4 K/UL — SIGNIFICANT CHANGE UP (ref 1.8–7.4)
IMM GRANULOCYTES NFR BLD AUTO: 0.4 % — SIGNIFICANT CHANGE UP (ref 0–0.9)
LYMPHOCYTES # BLD AUTO: 2.27 K/UL — SIGNIFICANT CHANGE UP (ref 1–3.3)
LYMPHOCYTES # BLD AUTO: 21.6 % — SIGNIFICANT CHANGE UP (ref 13–44)
MCHC RBC-ENTMCNC: 26.1 PG — LOW (ref 27–34)
MCHC RBC-ENTMCNC: 31.8 GM/DL — LOW (ref 32–36)
MCV RBC AUTO: 82.2 FL — SIGNIFICANT CHANGE UP (ref 80–100)
MONOCYTES # BLD AUTO: 0.81 K/UL — SIGNIFICANT CHANGE UP (ref 0–0.9)
MONOCYTES NFR BLD AUTO: 7.7 % — SIGNIFICANT CHANGE UP (ref 2–14)
NEUTROPHILS # BLD AUTO: 7.4 K/UL — SIGNIFICANT CHANGE UP (ref 1.8–7.4)
NEUTROPHILS NFR BLD AUTO: 70.2 % — SIGNIFICANT CHANGE UP (ref 43–77)
NRBC # BLD: 0 /100 WBCS — SIGNIFICANT CHANGE UP (ref 0–0)
NRBC # FLD: 0 K/UL — SIGNIFICANT CHANGE UP (ref 0–0)
PLATELET # BLD AUTO: 156 K/UL — SIGNIFICANT CHANGE UP (ref 150–400)
POTASSIUM SERPL-MCNC: 3.7 MMOL/L — SIGNIFICANT CHANGE UP (ref 3.5–5.3)
POTASSIUM SERPL-SCNC: 3.7 MMOL/L — SIGNIFICANT CHANGE UP (ref 3.5–5.3)
RBC # BLD: 3.71 M/UL — LOW (ref 3.8–5.2)
RBC # FLD: 14.2 % — SIGNIFICANT CHANGE UP (ref 10.3–14.5)
SODIUM SERPL-SCNC: 138 MMOL/L — SIGNIFICANT CHANGE UP (ref 135–145)
WBC # BLD: 10.53 K/UL — HIGH (ref 3.8–10.5)
WBC # FLD AUTO: 10.53 K/UL — HIGH (ref 3.8–10.5)

## 2023-02-12 PROCEDURE — 99232 SBSQ HOSP IP/OBS MODERATE 35: CPT

## 2023-02-12 RX ORDER — LANOLIN ALCOHOL/MO/W.PET/CERES
3 CREAM (GRAM) TOPICAL AT BEDTIME
Refills: 0 | Status: DISCONTINUED | OUTPATIENT
Start: 2023-02-12 | End: 2023-02-15

## 2023-02-12 RX ORDER — LANOLIN ALCOHOL/MO/W.PET/CERES
3 CREAM (GRAM) TOPICAL ONCE
Refills: 0 | Status: DISCONTINUED | OUTPATIENT
Start: 2023-02-12 | End: 2023-02-15

## 2023-02-12 RX ADMIN — Medication 975 MILLIGRAM(S): at 14:53

## 2023-02-12 RX ADMIN — Medication 3 MILLIGRAM(S): at 21:49

## 2023-02-12 RX ADMIN — GABAPENTIN 100 MILLIGRAM(S): 400 CAPSULE ORAL at 14:53

## 2023-02-12 RX ADMIN — LOSARTAN POTASSIUM 100 MILLIGRAM(S): 100 TABLET, FILM COATED ORAL at 05:34

## 2023-02-12 RX ADMIN — Medication 100 MILLIGRAM(S): at 05:34

## 2023-02-12 RX ADMIN — Medication 975 MILLIGRAM(S): at 16:04

## 2023-02-12 RX ADMIN — GABAPENTIN 100 MILLIGRAM(S): 400 CAPSULE ORAL at 05:34

## 2023-02-12 RX ADMIN — Medication 975 MILLIGRAM(S): at 06:30

## 2023-02-12 RX ADMIN — OXYCODONE HYDROCHLORIDE 10 MILLIGRAM(S): 5 TABLET ORAL at 09:36

## 2023-02-12 RX ADMIN — OXYCODONE HYDROCHLORIDE 10 MILLIGRAM(S): 5 TABLET ORAL at 02:00

## 2023-02-12 RX ADMIN — OXYCODONE HYDROCHLORIDE 10 MILLIGRAM(S): 5 TABLET ORAL at 16:22

## 2023-02-12 RX ADMIN — Medication 975 MILLIGRAM(S): at 21:45

## 2023-02-12 RX ADMIN — OXYCODONE HYDROCHLORIDE 10 MILLIGRAM(S): 5 TABLET ORAL at 08:30

## 2023-02-12 RX ADMIN — OXYCODONE HYDROCHLORIDE 10 MILLIGRAM(S): 5 TABLET ORAL at 17:20

## 2023-02-12 RX ADMIN — PANTOPRAZOLE SODIUM 40 MILLIGRAM(S): 20 TABLET, DELAYED RELEASE ORAL at 05:34

## 2023-02-12 RX ADMIN — OXYCODONE HYDROCHLORIDE 10 MILLIGRAM(S): 5 TABLET ORAL at 11:49

## 2023-02-12 RX ADMIN — GABAPENTIN 100 MILLIGRAM(S): 400 CAPSULE ORAL at 21:45

## 2023-02-12 RX ADMIN — Medication 975 MILLIGRAM(S): at 05:34

## 2023-02-12 RX ADMIN — Medication 975 MILLIGRAM(S): at 22:45

## 2023-02-12 RX ADMIN — OXYCODONE HYDROCHLORIDE 10 MILLIGRAM(S): 5 TABLET ORAL at 23:17

## 2023-02-12 RX ADMIN — OXYCODONE HYDROCHLORIDE 10 MILLIGRAM(S): 5 TABLET ORAL at 12:50

## 2023-02-12 RX ADMIN — OXYCODONE HYDROCHLORIDE 10 MILLIGRAM(S): 5 TABLET ORAL at 00:50

## 2023-02-12 NOTE — PROGRESS NOTE ADULT - SUBJECTIVE AND OBJECTIVE BOX
[facility] Medicine  Mariama Austin M.D  Pager [phone]  Available via MS Teams    SUBJECTIVE / OVERNIGHT EVENTS: No acute event overnight Tolerate procedure.     ADDITIONAL REVIEW OF SYSTEMS:    MEDICATIONS  (STANDING):  acetaminophen     Tablet .. 975 milliGRAM(s) Oral every 8 hours  gabapentin 100 milliGRAM(s) Oral three times a day  losartan 100 milliGRAM(s) Oral daily  melatonin 3 milliGRAM(s) Oral at bedtime  melatonin 3 milliGRAM(s) Oral once  pantoprazole    Tablet 40 milliGRAM(s) Oral before breakfast    MEDICATIONS  (PRN):  HYDROmorphone  Injectable 0.5 milliGRAM(s) IV Push every 8 hours PRN breakthrough pain  oxyCODONE    IR 5 milliGRAM(s) Oral every 4 hours PRN Moderate Pain (4 - 6)  oxyCODONE    IR 10 milliGRAM(s) Oral every 4 hours PRN Severe Pain (7 - 10)  traMADol 50 milliGRAM(s) Oral every 6 hours PRN Mild Pain (1 - 3)      I&O's Summary    11 Feb 2023 07:01  -  12 Feb 2023 07:00  --------------------------------------------------------  IN: 570 mL / OUT: 2907.5 mL / NET: -2337.5 mL    12 Feb 2023 07:01  -  12 Feb 2023 13:37  --------------------------------------------------------  IN: 360 mL / OUT: 780 mL / NET: -420 mL        PHYSICAL EXAM:  Vital Signs Last 24 Hrs  T(C): 37 (12 Feb 2023 09:20), Max: 37.3 (12 Feb 2023 05:42)  T(F): 98.6 (12 Feb 2023 09:20), Max: 99.1 (12 Feb 2023 05:42)  HR: 86 (12 Feb 2023 09:20) (64 - 86)  BP: 110/49 (12 Feb 2023 09:20) (75/66 - 141/79)  BP(mean): 99 (11 Feb 2023 19:00) (66 - 103)  RR: 18 (12 Feb 2023 09:20) (9 - 35)  SpO2: 97% (12 Feb 2023 09:20) (95% - 100%)    Parameters below as of 12 Feb 2023 09:20  Patient On (Oxygen Delivery Method): room air      CONSTITUTIONAL: NAD, well-developed, well-groomed  RESPIRATORY: Normal respiratory effort; lungs are clear to auscultation bilaterally  CARDIOVASCULAR: Regular rate and rhythm, normal S1 and S2, no murmur/rub/gallop; No lower extremity edema; Peripheral pulses are 2+ bilaterally  ABDOMEN: Nontender to palpation, normoactive bowel sounds, no rebound/guarding; No hepatosplenomegaly  MUSCULOSKELETAL:  Normal gait; no clubbing or cyanosis of digits; no joint swelling or tenderness to palpation  PSYCH: A+O to person, place, and time; affect appropriate  NEUROLOGY: CN 2-12 are intact and symmetric; no gross sensory deficits   SKIN: No rashes; no palpable lesions    LABS:                        9.7    10.53 )-----------( 156      ( 12 Feb 2023 06:00 )             30.5     02-12    138  |  105  |  15  ----------------------------<  104<H>  3.7   |  24  |  0.79    Ca    8.9      12 Feb 2023 06:00      PT/INR - ( 11 Feb 2023 00:17 )   PT: 12.8 sec;   INR: 1.10 ratio         PTT - ( 11 Feb 2023 00:17 )  PTT:28.8 sec          COVID-19 PCR: NotDetec (10 Feb 2023 11:02)      RADIOLOGY & ADDITIONAL TESTS:  New Results Reviewed Today:   New Imaging Personally Reviewed Today:  New Electrocardiogram Personally Reviewed Today:  Prior or Outpatient Records Reviewed Today:    COMMUNICATION:  Care Discussed with Consultants/Other Providers and Details of Discussion:  Discussions with Patient/Family:  PCP Communication:

## 2023-02-12 NOTE — PROGRESS NOTE ADULT - SUBJECTIVE AND OBJECTIVE BOX
Pt seen/examined. Doing well. Pain controlled. No acute overnight complaints or events.    T(C): 36.9 (02-11-23 @ 22:26), Max: 36.9 (02-11-23 @ 04:06)  HR: 83 (02-11-23 @ 22:26) (60 - 83)  BP: 127/55 (02-11-23 @ 22:26) (75/66 - 163/80)  RR: 17 (02-11-23 @ 22:26) (9 - 35)  SpO2: 99% (02-11-23 @ 22:26) (95% - 100%)  Wt(kg): --  - Gen: NAD    Exam:   Gen: Alert/Oriented, No Acute Distress  Pulm: Non-labored breathing  BACK:          Dressing: [x] clean/dry/intact  [ ] Other:           Drains: : CHACHO SS         Sensation: [x] intact to light touch  [ ] decreased:          Motor exam: [  ]               [ ] Lower extremeity            PF          DF         EHL       FHL                                                                                            R        5/5        5/5        5/5       5/5                                                        L         5/5        5/5        5/5       5/5                                                                  Calves Soft/Non-tender bilaterally           WWP; capillary refill <3 seconds     Pt is a  57 y/o  Female  s/p L3-5 lami/fusion. Patient is hemodynamically stable; recovering well from orhtopaedic standpoint.    -    Multimodal Pain control  -    mechanical DVT ppx  -    Resumed home meds  -    Check AM labs  -    Monitor CHACHO output  -    Weight bearing status: WBAT  -    PT/OT  -    Dispo planning

## 2023-02-12 NOTE — PHYSICAL THERAPY INITIAL EVALUATION ADULT - PRECAUTIONS/LIMITATIONS, REHAB EVAL
Patient: Mahnaz Calhoun    Procedure(s):  Kenalog Injection To Facial Scar  - Wound Class: I-Clean    Diagnosis: Hypertrophic Scar, Facial Injury   Diagnosis Additional Information: No value filed.    Anesthesia Type:   General     Note:  Airway :Nasal Cannula  Patient transferred to:PACU  Comments: Spontaneous respirations, VSS, transported to PACU with NC O2.       Vitals: (Last set prior to Anesthesia Care Transfer)    CRNA VITALS  2/28/2017 1008 - 2/28/2017 1046      2/28/2017             SpO2: 100 %    Resp Rate (set): 10                Electronically Signed By: SABINO Ovalles CRNA  February 28, 2017  10:46 AM   spinal precautions

## 2023-02-13 LAB
ANION GAP SERPL CALC-SCNC: 9 MMOL/L — SIGNIFICANT CHANGE UP (ref 7–14)
BUN SERPL-MCNC: 14 MG/DL — SIGNIFICANT CHANGE UP (ref 7–23)
CALCIUM SERPL-MCNC: 9.6 MG/DL — SIGNIFICANT CHANGE UP (ref 8.4–10.5)
CHLORIDE SERPL-SCNC: 101 MMOL/L — SIGNIFICANT CHANGE UP (ref 98–107)
CO2 SERPL-SCNC: 25 MMOL/L — SIGNIFICANT CHANGE UP (ref 22–31)
CREAT SERPL-MCNC: 0.7 MG/DL — SIGNIFICANT CHANGE UP (ref 0.5–1.3)
EGFR: 101 ML/MIN/1.73M2 — SIGNIFICANT CHANGE UP
GLUCOSE SERPL-MCNC: 108 MG/DL — HIGH (ref 70–99)
HCT VFR BLD CALC: 33.3 % — LOW (ref 34.5–45)
HGB BLD-MCNC: 10.6 G/DL — LOW (ref 11.5–15.5)
MCHC RBC-ENTMCNC: 26.3 PG — LOW (ref 27–34)
MCHC RBC-ENTMCNC: 31.8 GM/DL — LOW (ref 32–36)
MCV RBC AUTO: 82.6 FL — SIGNIFICANT CHANGE UP (ref 80–100)
NRBC # BLD: 0 /100 WBCS — SIGNIFICANT CHANGE UP (ref 0–0)
NRBC # FLD: 0 K/UL — SIGNIFICANT CHANGE UP (ref 0–0)
PLATELET # BLD AUTO: 158 K/UL — SIGNIFICANT CHANGE UP (ref 150–400)
POTASSIUM SERPL-MCNC: 3.8 MMOL/L — SIGNIFICANT CHANGE UP (ref 3.5–5.3)
POTASSIUM SERPL-SCNC: 3.8 MMOL/L — SIGNIFICANT CHANGE UP (ref 3.5–5.3)
RBC # BLD: 4.03 M/UL — SIGNIFICANT CHANGE UP (ref 3.8–5.2)
RBC # FLD: 14.6 % — HIGH (ref 10.3–14.5)
SARS-COV-2 RNA SPEC QL NAA+PROBE: SIGNIFICANT CHANGE UP
SODIUM SERPL-SCNC: 135 MMOL/L — SIGNIFICANT CHANGE UP (ref 135–145)
WBC # BLD: 11.69 K/UL — HIGH (ref 3.8–10.5)
WBC # FLD AUTO: 11.69 K/UL — HIGH (ref 3.8–10.5)

## 2023-02-13 PROCEDURE — 99232 SBSQ HOSP IP/OBS MODERATE 35: CPT

## 2023-02-13 RX ORDER — SODIUM CHLORIDE 9 MG/ML
500 INJECTION, SOLUTION INTRAVENOUS ONCE
Refills: 0 | Status: COMPLETED | OUTPATIENT
Start: 2023-02-13 | End: 2023-02-13

## 2023-02-13 RX ADMIN — GABAPENTIN 100 MILLIGRAM(S): 400 CAPSULE ORAL at 13:43

## 2023-02-13 RX ADMIN — OXYCODONE HYDROCHLORIDE 10 MILLIGRAM(S): 5 TABLET ORAL at 11:06

## 2023-02-13 RX ADMIN — Medication 975 MILLIGRAM(S): at 07:38

## 2023-02-13 RX ADMIN — HYDROMORPHONE HYDROCHLORIDE 0.5 MILLIGRAM(S): 2 INJECTION INTRAMUSCULAR; INTRAVENOUS; SUBCUTANEOUS at 17:31

## 2023-02-13 RX ADMIN — HYDROMORPHONE HYDROCHLORIDE 0.5 MILLIGRAM(S): 2 INJECTION INTRAMUSCULAR; INTRAVENOUS; SUBCUTANEOUS at 09:19

## 2023-02-13 RX ADMIN — Medication 3 MILLIGRAM(S): at 21:22

## 2023-02-13 RX ADMIN — OXYCODONE HYDROCHLORIDE 10 MILLIGRAM(S): 5 TABLET ORAL at 00:17

## 2023-02-13 RX ADMIN — HYDROMORPHONE HYDROCHLORIDE 0.5 MILLIGRAM(S): 2 INJECTION INTRAMUSCULAR; INTRAVENOUS; SUBCUTANEOUS at 18:01

## 2023-02-13 RX ADMIN — Medication 975 MILLIGRAM(S): at 21:21

## 2023-02-13 RX ADMIN — OXYCODONE HYDROCHLORIDE 10 MILLIGRAM(S): 5 TABLET ORAL at 21:33

## 2023-02-13 RX ADMIN — OXYCODONE HYDROCHLORIDE 10 MILLIGRAM(S): 5 TABLET ORAL at 05:30

## 2023-02-13 RX ADMIN — Medication 975 MILLIGRAM(S): at 13:42

## 2023-02-13 RX ADMIN — OXYCODONE HYDROCHLORIDE 10 MILLIGRAM(S): 5 TABLET ORAL at 06:30

## 2023-02-13 RX ADMIN — PANTOPRAZOLE SODIUM 40 MILLIGRAM(S): 20 TABLET, DELAYED RELEASE ORAL at 06:38

## 2023-02-13 RX ADMIN — OXYCODONE HYDROCHLORIDE 10 MILLIGRAM(S): 5 TABLET ORAL at 14:40

## 2023-02-13 RX ADMIN — Medication 975 MILLIGRAM(S): at 14:12

## 2023-02-13 RX ADMIN — SODIUM CHLORIDE 1000 MILLILITER(S): 9 INJECTION, SOLUTION INTRAVENOUS at 14:28

## 2023-02-13 RX ADMIN — LOSARTAN POTASSIUM 100 MILLIGRAM(S): 100 TABLET, FILM COATED ORAL at 05:36

## 2023-02-13 RX ADMIN — OXYCODONE HYDROCHLORIDE 10 MILLIGRAM(S): 5 TABLET ORAL at 15:10

## 2023-02-13 RX ADMIN — Medication 975 MILLIGRAM(S): at 06:38

## 2023-02-13 RX ADMIN — HYDROMORPHONE HYDROCHLORIDE 0.5 MILLIGRAM(S): 2 INJECTION INTRAMUSCULAR; INTRAVENOUS; SUBCUTANEOUS at 09:49

## 2023-02-13 RX ADMIN — OXYCODONE HYDROCHLORIDE 10 MILLIGRAM(S): 5 TABLET ORAL at 22:30

## 2023-02-13 RX ADMIN — OXYCODONE HYDROCHLORIDE 10 MILLIGRAM(S): 5 TABLET ORAL at 10:36

## 2023-02-13 RX ADMIN — Medication 975 MILLIGRAM(S): at 22:20

## 2023-02-13 RX ADMIN — GABAPENTIN 100 MILLIGRAM(S): 400 CAPSULE ORAL at 05:37

## 2023-02-13 RX ADMIN — GABAPENTIN 100 MILLIGRAM(S): 400 CAPSULE ORAL at 21:22

## 2023-02-13 NOTE — PROGRESS NOTE ADULT - SUBJECTIVE AND OBJECTIVE BOX
[facility] Medicine  Pager [phone]    Patient is a 58y old  Female who presents with a chief complaint of worsening back pain (10 Feb 2023 13:11)      INTERVAL HPI/OVERNIGHT EVENTS:    MEDICATIONS  (STANDING):  acetaminophen     Tablet .. 975 milliGRAM(s) Oral every 8 hours  gabapentin 100 milliGRAM(s) Oral three times a day  losartan 100 milliGRAM(s) Oral daily  melatonin 3 milliGRAM(s) Oral at bedtime  melatonin 3 milliGRAM(s) Oral once  pantoprazole    Tablet 40 milliGRAM(s) Oral before breakfast    MEDICATIONS  (PRN):  HYDROmorphone  Injectable 0.5 milliGRAM(s) IV Push every 8 hours PRN breakthrough pain  oxyCODONE    IR 5 milliGRAM(s) Oral every 4 hours PRN Moderate Pain (4 - 6)  oxyCODONE    IR 10 milliGRAM(s) Oral every 4 hours PRN Severe Pain (7 - 10)  traMADol 50 milliGRAM(s) Oral every 6 hours PRN Mild Pain (1 - 3)      Allergies    No Known Allergies    Intolerances        REVIEW OF SYSTEMS:  Please see interval HPI:    Vital Signs Last 24 Hrs  T(C): 36.6 (13 Feb 2023 09:45), Max: 37.5 (12 Feb 2023 17:13)  T(F): 97.8 (13 Feb 2023 09:45), Max: 99.5 (12 Feb 2023 17:13)  HR: 87 (13 Feb 2023 09:45) (73 - 87)  BP: 106/53 (13 Feb 2023 09:45) (106/53 - 137/74)  BP(mean): --  RR: 19 (13 Feb 2023 09:45) (17 - 19)  SpO2: 97% (13 Feb 2023 09:45) (97% - 100%)    Parameters below as of 13 Feb 2023 09:45  Patient On (Oxygen Delivery Method): room air      I&O's Detail    12 Feb 2023 07:01  -  13 Feb 2023 07:00  --------------------------------------------------------  IN:    Oral Fluid: 1080 mL  Total IN: 1080 mL    OUT:    Bulb (mL): 175 mL    Indwelling Catheter - Urethral (mL): 3200 mL  Total OUT: 3375 mL    Total NET: -2295 mL      13 Feb 2023 07:01  -  13 Feb 2023 12:42  --------------------------------------------------------  IN:  Total IN: 0 mL    OUT:    Bulb (mL): 20 mL    Indwelling Catheter - Urethral (mL): 800 mL  Total OUT: 820 mL    Total NET: -820 mL            PHYSICAL EXAM:  GENERAL:   HEAD:    EYES:   ENMT:   NECK:   NERVOUS SYSTEM:    CHEST/LUNG:   HEART:   ABDOMEN:   EXTREMITIES:    LYMPH:   SKIN:     LABS:                        10.6   11.69 )-----------( 158      ( 13 Feb 2023 05:56 )             33.3     13 Feb 2023 05:56    135    |  101    |  14     ----------------------------<  108    3.8     |  25     |  0.70     Ca    9.6        13 Feb 2023 05:56        CAPILLARY BLOOD GLUCOSE        BLOOD CULTURE    RADIOLOGY & ADDITIONAL TESTS:    Imaging Personally Reviewed:  [ ] YES     Consultant(s) Notes Reviewed:      Care Discussed with Consultants/Other Providers: [facility] Medicine  Pager [phone]    Patient is a 58y old  Female who presents with a chief complaint of worsening back pain (10 Feb 2023 13:11)      INTERVAL HPI/OVERNIGHT EVENTS:  Complaining of 10/10 back pain currently, notes some temporary relief with administration of pain medication. Reports appetite ok, able to pass gas but no BM yet. Hopeful for improvement in pain control, understands goal for rehab.     MEDICATIONS  (STANDING):  acetaminophen     Tablet .. 975 milliGRAM(s) Oral every 8 hours  gabapentin 100 milliGRAM(s) Oral three times a day  losartan 100 milliGRAM(s) Oral daily  melatonin 3 milliGRAM(s) Oral at bedtime  melatonin 3 milliGRAM(s) Oral once  pantoprazole    Tablet 40 milliGRAM(s) Oral before breakfast    MEDICATIONS  (PRN):  HYDROmorphone  Injectable 0.5 milliGRAM(s) IV Push every 8 hours PRN breakthrough pain  oxyCODONE    IR 5 milliGRAM(s) Oral every 4 hours PRN Moderate Pain (4 - 6)  oxyCODONE    IR 10 milliGRAM(s) Oral every 4 hours PRN Severe Pain (7 - 10)  traMADol 50 milliGRAM(s) Oral every 6 hours PRN Mild Pain (1 - 3)    Allergies  No Known Allergies    Intolerances    REVIEW OF SYSTEMS:  Please see interval HPI:    Vital Signs Last 24 Hrs  T(C): 36.6 (13 Feb 2023 09:45), Max: 37.5 (12 Feb 2023 17:13)  T(F): 97.8 (13 Feb 2023 09:45), Max: 99.5 (12 Feb 2023 17:13)  HR: 87 (13 Feb 2023 09:45) (73 - 87)  BP: 106/53 (13 Feb 2023 09:45) (106/53 - 137/74)  BP(mean): --  RR: 19 (13 Feb 2023 09:45) (17 - 19)  SpO2: 97% (13 Feb 2023 09:45) (97% - 100%)    Parameters below as of 13 Feb 2023 09:45  Patient On (Oxygen Delivery Method): room air      I&O's Detail    12 Feb 2023 07:01  -  13 Feb 2023 07:00  --------------------------------------------------------  IN:    Oral Fluid: 1080 mL  Total IN: 1080 mL    OUT:    Bulb (mL): 175 mL    Indwelling Catheter - Urethral (mL): 3200 mL  Total OUT: 3375 mL    Total NET: -2295 mL      13 Feb 2023 07:01  -  13 Feb 2023 12:42  --------------------------------------------------------  IN:  Total IN: 0 mL    OUT:    Bulb (mL): 20 mL    Indwelling Catheter - Urethral (mL): 800 mL  Total OUT: 820 mL    Total NET: -820 mL    PHYSICAL EXAM:  GENERAL: uncomfortable appearing, lying in bed  HEAD:  NC/AT  EYES: EOMI, clear sclera/conjunctiva  ENMT: MMM, hearing intact to voice  NECK: supple, no JVD  NERVOUS SYSTEM: moving upper extremities, wiggles toes, SILT grossly  CHEST/LUNG: CTAB, no wheeze appreciated, comfortable on RA, speaking in full sentences  HEART: S1S2 RRR  ABDOMEN: soft, non-tender, slightly tympanic, +BS  EXTREMITIES:  no c/c/e      LABS:                        10.6   11.69 )-----------( 158      ( 13 Feb 2023 05:56 )             33.3     13 Feb 2023 05:56    135    |  101    |  14     ----------------------------<  108    3.8     |  25     |  0.70     Ca    9.6        13 Feb 2023 05:56    CAPILLARY BLOOD GLUCOSE    BLOOD CULTURE    RADIOLOGY & ADDITIONAL TESTS:    Imaging Personally Reviewed:  [ ] YES     Consultant(s) Notes Reviewed:  Ortho    Care Discussed with Consultants/Other Providers: Ortho [id number] re: pending MAYI, plan to d/c yeung when OOB

## 2023-02-13 NOTE — PROGRESS NOTE ADULT - SUBJECTIVE AND OBJECTIVE BOX
Orthopedic Progress Note     S:  No acute events overnight, pain is well controlled.  Patient denies any chest pain, SOB, N/V, fevers/chills.    T(C): 36.7 (02-13-23 @ 05:45), Max: 37.5 (02-12-23 @ 17:13)  HR: 73 (02-13-23 @ 05:45) (73 - 87)  BP: 137/74 (02-13-23 @ 05:45) (110/49 - 137/74)  RR: 18 (02-13-23 @ 05:45) (17 - 18)  SpO2: 100% (02-13-23 @ 05:45) (97% - 100%)  Wt(kg): --I&O's Summary    12 Feb 2023 07:01  -  13 Feb 2023 07:00  --------------------------------------------------------  IN: 1080 mL / OUT: 3375 mL / NET: -2295 mL        O:  Physical exam:  Gen: Alert and Oriented x3, No Acute Distress  CARDS: +S1/S2, RRR  PULM: CTAB  ABD: soft, nondistended, nontender to palpation  EXT:            Dressing: spine dressing c/d/i, CHACHO drain in place with sanguinous output            Motor:                                                           PF          DF         EHL       FHL                                                                                            R        5/5        5/5        5/5       5/5                                                        L         5/5        5/5        5/5       5/5                  Sensation: SILT throughout BL LE             Pulses: 2+, calves soft and nontender to palpation            Labs:                        9.7    10.53 )-----------( 156      ( 12 Feb 2023 06:00 )             30.5    02-12    138  |  105  |  15  ----------------------------<  104<H>  3.7   |  24  |  0.79    Ca    8.9      12 Feb 2023 06:00

## 2023-02-14 ENCOUNTER — TRANSCRIPTION ENCOUNTER (OUTPATIENT)
Age: 58
End: 2023-02-14

## 2023-02-14 LAB
ANION GAP SERPL CALC-SCNC: 11 MMOL/L — SIGNIFICANT CHANGE UP (ref 7–14)
BASOPHILS # BLD AUTO: 0.02 K/UL — SIGNIFICANT CHANGE UP (ref 0–0.2)
BASOPHILS NFR BLD AUTO: 0.2 % — SIGNIFICANT CHANGE UP (ref 0–2)
BUN SERPL-MCNC: 13 MG/DL — SIGNIFICANT CHANGE UP (ref 7–23)
CALCIUM SERPL-MCNC: 9.5 MG/DL — SIGNIFICANT CHANGE UP (ref 8.4–10.5)
CHLORIDE SERPL-SCNC: 100 MMOL/L — SIGNIFICANT CHANGE UP (ref 98–107)
CO2 SERPL-SCNC: 23 MMOL/L — SIGNIFICANT CHANGE UP (ref 22–31)
CREAT SERPL-MCNC: 0.66 MG/DL — SIGNIFICANT CHANGE UP (ref 0.5–1.3)
EGFR: 103 ML/MIN/1.73M2 — SIGNIFICANT CHANGE UP
EOSINOPHIL # BLD AUTO: 0.17 K/UL — SIGNIFICANT CHANGE UP (ref 0–0.5)
EOSINOPHIL NFR BLD AUTO: 1.9 % — SIGNIFICANT CHANGE UP (ref 0–6)
GLUCOSE SERPL-MCNC: 125 MG/DL — HIGH (ref 70–99)
HCT VFR BLD CALC: 31.6 % — LOW (ref 34.5–45)
HGB BLD-MCNC: 10.2 G/DL — LOW (ref 11.5–15.5)
IANC: 4.89 K/UL — SIGNIFICANT CHANGE UP (ref 1.8–7.4)
IMM GRANULOCYTES NFR BLD AUTO: 0.2 % — SIGNIFICANT CHANGE UP (ref 0–0.9)
LYMPHOCYTES # BLD AUTO: 2.99 K/UL — SIGNIFICANT CHANGE UP (ref 1–3.3)
LYMPHOCYTES # BLD AUTO: 34 % — SIGNIFICANT CHANGE UP (ref 13–44)
MCHC RBC-ENTMCNC: 26.5 PG — LOW (ref 27–34)
MCHC RBC-ENTMCNC: 32.3 GM/DL — SIGNIFICANT CHANGE UP (ref 32–36)
MCV RBC AUTO: 82.1 FL — SIGNIFICANT CHANGE UP (ref 80–100)
MONOCYTES # BLD AUTO: 0.7 K/UL — SIGNIFICANT CHANGE UP (ref 0–0.9)
MONOCYTES NFR BLD AUTO: 8 % — SIGNIFICANT CHANGE UP (ref 2–14)
NEUTROPHILS # BLD AUTO: 4.89 K/UL — SIGNIFICANT CHANGE UP (ref 1.8–7.4)
NEUTROPHILS NFR BLD AUTO: 55.7 % — SIGNIFICANT CHANGE UP (ref 43–77)
NRBC # BLD: 0 /100 WBCS — SIGNIFICANT CHANGE UP (ref 0–0)
NRBC # FLD: 0 K/UL — SIGNIFICANT CHANGE UP (ref 0–0)
PLATELET # BLD AUTO: 162 K/UL — SIGNIFICANT CHANGE UP (ref 150–400)
POTASSIUM SERPL-MCNC: 4 MMOL/L — SIGNIFICANT CHANGE UP (ref 3.5–5.3)
POTASSIUM SERPL-SCNC: 4 MMOL/L — SIGNIFICANT CHANGE UP (ref 3.5–5.3)
RBC # BLD: 3.85 M/UL — SIGNIFICANT CHANGE UP (ref 3.8–5.2)
RBC # FLD: 14.6 % — HIGH (ref 10.3–14.5)
SODIUM SERPL-SCNC: 134 MMOL/L — LOW (ref 135–145)
WBC # BLD: 8.79 K/UL — SIGNIFICANT CHANGE UP (ref 3.8–10.5)
WBC # FLD AUTO: 8.79 K/UL — SIGNIFICANT CHANGE UP (ref 3.8–10.5)

## 2023-02-14 PROCEDURE — 99232 SBSQ HOSP IP/OBS MODERATE 35: CPT

## 2023-02-14 RX ORDER — POLYETHYLENE GLYCOL 3350 17 G/17G
17 POWDER, FOR SOLUTION ORAL DAILY
Refills: 0 | Status: DISCONTINUED | OUTPATIENT
Start: 2023-02-14 | End: 2023-02-15

## 2023-02-14 RX ORDER — SENNA PLUS 8.6 MG/1
2 TABLET ORAL AT BEDTIME
Refills: 0 | Status: DISCONTINUED | OUTPATIENT
Start: 2023-02-14 | End: 2023-02-15

## 2023-02-14 RX ADMIN — GABAPENTIN 100 MILLIGRAM(S): 400 CAPSULE ORAL at 05:01

## 2023-02-14 RX ADMIN — OXYCODONE HYDROCHLORIDE 10 MILLIGRAM(S): 5 TABLET ORAL at 14:50

## 2023-02-14 RX ADMIN — OXYCODONE HYDROCHLORIDE 10 MILLIGRAM(S): 5 TABLET ORAL at 19:17

## 2023-02-14 RX ADMIN — OXYCODONE HYDROCHLORIDE 10 MILLIGRAM(S): 5 TABLET ORAL at 06:59

## 2023-02-14 RX ADMIN — Medication 975 MILLIGRAM(S): at 14:50

## 2023-02-14 RX ADMIN — OXYCODONE HYDROCHLORIDE 10 MILLIGRAM(S): 5 TABLET ORAL at 20:07

## 2023-02-14 RX ADMIN — Medication 3 MILLIGRAM(S): at 21:51

## 2023-02-14 RX ADMIN — Medication 975 MILLIGRAM(S): at 05:00

## 2023-02-14 RX ADMIN — OXYCODONE HYDROCHLORIDE 10 MILLIGRAM(S): 5 TABLET ORAL at 06:00

## 2023-02-14 RX ADMIN — SENNA PLUS 2 TABLET(S): 8.6 TABLET ORAL at 21:52

## 2023-02-14 RX ADMIN — Medication 975 MILLIGRAM(S): at 13:51

## 2023-02-14 RX ADMIN — Medication 975 MILLIGRAM(S): at 06:00

## 2023-02-14 RX ADMIN — OXYCODONE HYDROCHLORIDE 10 MILLIGRAM(S): 5 TABLET ORAL at 13:50

## 2023-02-14 RX ADMIN — GABAPENTIN 100 MILLIGRAM(S): 400 CAPSULE ORAL at 21:52

## 2023-02-14 RX ADMIN — PANTOPRAZOLE SODIUM 40 MILLIGRAM(S): 20 TABLET, DELAYED RELEASE ORAL at 05:03

## 2023-02-14 RX ADMIN — POLYETHYLENE GLYCOL 3350 17 GRAM(S): 17 POWDER, FOR SOLUTION ORAL at 12:39

## 2023-02-14 RX ADMIN — GABAPENTIN 100 MILLIGRAM(S): 400 CAPSULE ORAL at 13:50

## 2023-02-14 RX ADMIN — Medication 975 MILLIGRAM(S): at 22:56

## 2023-02-14 RX ADMIN — Medication 975 MILLIGRAM(S): at 21:52

## 2023-02-14 NOTE — PROGRESS NOTE ADULT - SUBJECTIVE AND OBJECTIVE BOX
[facility] Medicine  Pager [phone]    Patient is a 58y old  Female who presents with a chief complaint of L3-5 lami/PSF (14 Feb 2023 09:23)      INTERVAL HPI/OVERNIGHT EVENTS:    MEDICATIONS  (STANDING):  acetaminophen     Tablet .. 975 milliGRAM(s) Oral every 8 hours  gabapentin 100 milliGRAM(s) Oral three times a day  losartan 100 milliGRAM(s) Oral daily  melatonin 3 milliGRAM(s) Oral at bedtime  melatonin 3 milliGRAM(s) Oral once  pantoprazole    Tablet 40 milliGRAM(s) Oral before breakfast  polyethylene glycol 3350 17 Gram(s) Oral daily  senna 2 Tablet(s) Oral at bedtime    MEDICATIONS  (PRN):  bisacodyl Suppository 10 milliGRAM(s) Rectal daily PRN Constipation  HYDROmorphone  Injectable 0.5 milliGRAM(s) IV Push every 8 hours PRN breakthrough pain  oxyCODONE    IR 5 milliGRAM(s) Oral every 4 hours PRN Moderate Pain (4 - 6)  oxyCODONE    IR 10 milliGRAM(s) Oral every 4 hours PRN Severe Pain (7 - 10)  traMADol 50 milliGRAM(s) Oral every 6 hours PRN Mild Pain (1 - 3)      Allergies    No Known Allergies    Intolerances        REVIEW OF SYSTEMS:  Please see interval HPI:    Vital Signs Last 24 Hrs  T(C): 36.9 (14 Feb 2023 09:37), Max: 37.2 (14 Feb 2023 04:55)  T(F): 98.5 (14 Feb 2023 09:37), Max: 98.9 (14 Feb 2023 04:55)  HR: 79 (14 Feb 2023 09:37) (75 - 83)  BP: 132/58 (14 Feb 2023 09:37) (103/50 - 132/58)  BP(mean): --  RR: 18 (14 Feb 2023 09:37) (16 - 18)  SpO2: 100% (14 Feb 2023 09:37) (96% - 100%)    Parameters below as of 14 Feb 2023 09:37  Patient On (Oxygen Delivery Method): room air      I&O's Detail    13 Feb 2023 07:01  -  14 Feb 2023 07:00  --------------------------------------------------------  IN:    Oral Fluid: 270 mL  Total IN: 270 mL    OUT:    Bulb (mL): 110 mL    Indwelling Catheter - Urethral (mL): 1950 mL    Voided (mL): 1300 mL  Total OUT: 3360 mL    Total NET: -3090 mL            PHYSICAL EXAM:  GENERAL:   HEAD:    EYES:   ENMT:   NECK:   NERVOUS SYSTEM:    CHEST/LUNG:   HEART:   ABDOMEN:   EXTREMITIES:    LYMPH:   SKIN:     LABS:                        10.2   8.79  )-----------( 162      ( 14 Feb 2023 05:30 )             31.6     14 Feb 2023 05:30    134    |  100    |  13     ----------------------------<  125    4.0     |  23     |  0.66     Ca    9.5        14 Feb 2023 05:30        CAPILLARY BLOOD GLUCOSE        BLOOD CULTURE    RADIOLOGY & ADDITIONAL TESTS:    Imaging Personally Reviewed:  [ ] YES     Consultant(s) Notes Reviewed:      Care Discussed with Consultants/Other Providers: [facility] Medicine  Pager [phone]    Patient is a 58y old  Female who presents with a chief complaint of L3-5 lami/PSF (14 Feb 2023 09:23)      INTERVAL HPI/OVERNIGHT EVENTS:  Sitting in chair, reports pain currently 8-9/10 (bit better, was 10/10 yesterday), encouraged to continue to work w/ PT. Understands goal for [facility]I placement, family to provide choices. Only complaint right now is constipation, requests assistance with bowel movement. Discussed addition of bowel regimen and potential increase/titration if needed. Is having flatus.     MEDICATIONS  (STANDING):  acetaminophen     Tablet .. 975 milliGRAM(s) Oral every 8 hours  gabapentin 100 milliGRAM(s) Oral three times a day  losartan 100 milliGRAM(s) Oral daily  melatonin 3 milliGRAM(s) Oral at bedtime  melatonin 3 milliGRAM(s) Oral once  pantoprazole    Tablet 40 milliGRAM(s) Oral before breakfast  polyethylene glycol 3350 17 Gram(s) Oral daily  senna 2 Tablet(s) Oral at bedtime    MEDICATIONS  (PRN):  bisacodyl Suppository 10 milliGRAM(s) Rectal daily PRN Constipation  HYDROmorphone  Injectable 0.5 milliGRAM(s) IV Push every 8 hours PRN breakthrough pain  oxyCODONE    IR 5 milliGRAM(s) Oral every 4 hours PRN Moderate Pain (4 - 6)  oxyCODONE    IR 10 milliGRAM(s) Oral every 4 hours PRN Severe Pain (7 - 10)  traMADol 50 milliGRAM(s) Oral every 6 hours PRN Mild Pain (1 - 3)      Allergies    No Known Allergies    Intolerances        REVIEW OF SYSTEMS:  Please see interval HPI:    Vital Signs Last 24 Hrs  T(C): 36.9 (14 Feb 2023 09:37), Max: 37.2 (14 Feb 2023 04:55)  T(F): 98.5 (14 Feb 2023 09:37), Max: 98.9 (14 Feb 2023 04:55)  HR: 79 (14 Feb 2023 09:37) (75 - 83)  BP: 132/58 (14 Feb 2023 09:37) (103/50 - 132/58)  BP(mean): --  RR: 18 (14 Feb 2023 09:37) (16 - 18)  SpO2: 100% (14 Feb 2023 09:37) (96% - 100%)    Parameters below as of 14 Feb 2023 09:37  Patient On (Oxygen Delivery Method): room air      I&O's Detail    13 Feb 2023 07:01  -  14 Feb 2023 07:00  --------------------------------------------------------  IN:    Oral Fluid: 270 mL  Total IN: 270 mL    OUT:    Bulb (mL): 110 mL    Indwelling Catheter - Urethral (mL): 1950 mL    Voided (mL): 1300 mL  Total OUT: 3360 mL    Total NET: -3090 mL      PHYSICAL EXAM:  GENERAL: sitting in chair, appears more comfortable today  HEAD:  NC/AT  EYES: EOMI, clear sclera/conjunctiva  ENMT: MMM, hearing intact to voice  NECK: supple, no JVD  NERVOUS SYSTEM: moving extremities, SILT grossly  CHEST/LUNG: CTAB, no wheeze appreciated, comfortable on RA, speaking in full sentences  HEART: S1S2 RRR  ABDOMEN: soft, non-tender, slightly tympanic, +BS  EXTREMITIES:  no c/c/e  SKIN: +drain w/ serosanguinous output    LABS:                        10.2   8.79  )-----------( 162      ( 14 Feb 2023 05:30 )             31.6     14 Feb 2023 05:30    134    |  100    |  13     ----------------------------<  125    4.0     |  23     |  0.66     Ca    9.5        14 Feb 2023 05:30        CAPILLARY BLOOD GLUCOSE        BLOOD CULTURE    RADIOLOGY & ADDITIONAL TESTS:    Imaging Personally Reviewed:  [ ] YES     Consultant(s) Notes Reviewed:  Ortho     Care Discussed with Consultants/Other Providers: Ortho Lara re: pending drain output, goal is for MAYI, titrate bowel regimen as needed

## 2023-02-14 NOTE — DISCHARGE NOTE PROVIDER - CARE PROVIDER_API CALL
Alla Funk (DO)  Orthopaedic Surgery Surgery  [address] [facility], [address] [place] [address]  Phone: [phone]  Fax: [phone]  Follow Up Time: 2 weeks

## 2023-02-14 NOTE — PROGRESS NOTE ADULT - SUBJECTIVE AND OBJECTIVE BOX
Orthopedic Progress Note     S:  No acute events overnight, pain is well controlled.  Patient denies any chest pain, SOB, N/V, fevers/chills. Complains of constipation, has been passing gas    T(C): 37.2 (02-14-23 @ 04:55), Max: 37.2 (02-14-23 @ 04:55)  HR: 75 (02-14-23 @ 04:55) (75 - 87)  BP: 122/58 (02-14-23 @ 04:55) (103/50 - 129/76)  RR: 17 (02-14-23 @ 04:55) (16 - 19)  SpO2: 100% (02-14-23 @ 04:55) (96% - 100%)  Wt(kg): --I&O's Summary    12 Feb 2023 07:01  -  13 Feb 2023 07:00  --------------------------------------------------------  IN: 1080 mL / OUT: 3375 mL / NET: -2295 mL    13 Feb 2023 07:01  -  14 Feb 2023 06:31  --------------------------------------------------------  IN: 270 mL / OUT: 3360 mL / NET: -3090 mL        O:  Physical exam:  Gen: Alert and Oriented x3, No Acute Distress  Dressing: spine dressing c/d/i, CHACHO drain in place with sanguinous output 50/110            Motor:                                                           PF          DF         EHL       FHL                                                                                            R        5/5        5/5        5/5       5/5                                                        L         5/5        5/5        5/5       5/5                  Sensation: SILT throughout BL LE             Pulses: 2+, calves soft and nontender to palpation     Labs:                        10.2   8.79  )-----------( 162      ( 14 Feb 2023 05:30 )             31.6    02-13    135  |  101  |  14  ----------------------------<  108<H>  3.8   |  25  |  0.70    Ca    9.6      13 Feb 2023 05:56        Imaging:

## 2023-02-14 NOTE — DISCHARGE NOTE PROVIDER - NSDCMRMEDTOKEN_GEN_ALL_CORE_FT
gabapentin 100 mg oral capsule: 1 cap(s) orally 3 times a day  LOSARTAN POTASSIUM 100 MG TAB: TAKE 1 TABLET BY MOUTH EVERY DAY  meloxicam 7.5 mg oral tablet: 1 tab(s) orally once a day  pantoprazole 40 mg oral delayed release tablet: 1 tab(s) orally once a day   acetaminophen 325 mg oral tablet: 3 tab(s) orally every 8 hours  gabapentin 100 mg oral capsule: 1 cap(s) orally 3 times a day  losartan 100 mg oral tablet: 1 tab(s) orally once a day  melatonin 3 mg oral tablet: 1 tab(s) orally once a day (at bedtime)  oxyCODONE 10 mg oral tablet: 1 tab(s) orally every 4 hours, As needed, Severe Pain (7 - 10)  oxyCODONE 5 mg oral tablet: 1 tab(s) orally every 4 hours, As needed, Moderate Pain (4 - 6)  pantoprazole 40 mg oral delayed release tablet: 1 tab(s) orally once a day (before a meal)  polyethylene glycol 3350 oral powder for reconstitution: 17 gram(s) orally once a day  senna leaf extract oral tablet: 2 tab(s) orally once a day (at bedtime)  traMADol 50 mg oral tablet: 1 tab(s) orally every 6 hours, As needed, Mild Pain (1 - 3)

## 2023-02-14 NOTE — DISCHARGE NOTE PROVIDER - NSDCCPTREATMENT_GEN_ALL_CORE_FT
PRINCIPAL PROCEDURE  Procedure: Fusion, spine, lumbar, interbody, 1-2 levels, transforaminal approach  Findings and Treatment: See dictated operative note

## 2023-02-14 NOTE — DISCHARGE NOTE PROVIDER - HOSPITAL COURSE
Patient is a 57 yo female hx of lumbar spinal stenosis s/p L3-5 lumbar lami/PSF with Dr. Funk on 2/11/2023 without any intraoperative complications.  Patient is stable for discharge as per surgeon.  Patient is tolerating Physical Therapy: weight bearing as tolerated, gait training, and no heavy lifting or bending.  If applicable, Staples/sutures to be removed on post op day #14 in surgeon's office. Please remove dressing two days after discharge to home. The patient had no post operative complications and clinically progressed faster than anticipated. Patient was safely and appropriately discharged to rehab.  NO ADVIL, ASPIRIN, MOTRIN, IBUPROFEN until instructed otherwise by surgeon.  Follow up with Dr. Funk's office in 1week. Please remove outer dressing 48 hours after discharge from hospital. Patient is a 57 yo female hx of lumbar spinal stenosis s/p L3-5 lumbar lami/PSF with Dr. Funk on 2/11/2023 without any intraoperative complications.  Patient is stable for discharge as per surgeon.  Patient is tolerating Physical Therapy: weight bearing as tolerated, gait training, and no heavy lifting or bending.  If applicable, Staples/sutures to be removed on post op day #14 in surgeon's office. Please remove dressing two days after discharge to rehab. The patient had no post operative complications and clinically progressed faster than anticipated. Patient was safely and appropriately discharged to rehab.  NO ADVIL, ASPIRIN, MOTRIN, IBUPROFEN until instructed otherwise by surgeon.  Follow up with Dr. Funk's office in 1week. Please remove outer dressing 48 hours after discharge from hospital.

## 2023-02-15 ENCOUNTER — TRANSCRIPTION ENCOUNTER (OUTPATIENT)
Age: 58
End: 2023-02-15

## 2023-02-15 VITALS
RESPIRATION RATE: 17 BRPM | DIASTOLIC BLOOD PRESSURE: 90 MMHG | SYSTOLIC BLOOD PRESSURE: 147 MMHG | TEMPERATURE: 99 F | HEART RATE: 100 BPM | OXYGEN SATURATION: 98 %

## 2023-02-15 LAB — SURGICAL PATHOLOGY STUDY: SIGNIFICANT CHANGE UP

## 2023-02-15 PROCEDURE — 99232 SBSQ HOSP IP/OBS MODERATE 35: CPT

## 2023-02-15 RX ORDER — OXYCODONE HYDROCHLORIDE 5 MG/1
1 TABLET ORAL
Qty: 0 | Refills: 0 | DISCHARGE
Start: 2023-02-15

## 2023-02-15 RX ORDER — GABAPENTIN 400 MG/1
1 CAPSULE ORAL
Qty: 0 | Refills: 0 | DISCHARGE

## 2023-02-15 RX ORDER — LOSARTAN POTASSIUM 100 MG/1
1 TABLET, FILM COATED ORAL
Qty: 0 | Refills: 0 | DISCHARGE
Start: 2023-02-15

## 2023-02-15 RX ORDER — TRAMADOL HYDROCHLORIDE 50 MG/1
1 TABLET ORAL
Qty: 0 | Refills: 0 | DISCHARGE
Start: 2023-02-15

## 2023-02-15 RX ORDER — LOSARTAN POTASSIUM 100 MG/1
0 TABLET, FILM COATED ORAL
Qty: 0 | Refills: 0 | DISCHARGE

## 2023-02-15 RX ORDER — MELOXICAM 15 MG/1
1 TABLET ORAL
Qty: 0 | Refills: 0 | DISCHARGE

## 2023-02-15 RX ORDER — LANOLIN ALCOHOL/MO/W.PET/CERES
1 CREAM (GRAM) TOPICAL
Qty: 0 | Refills: 0 | DISCHARGE
Start: 2023-02-15

## 2023-02-15 RX ORDER — ACETAMINOPHEN 500 MG
3 TABLET ORAL
Qty: 0 | Refills: 0 | DISCHARGE
Start: 2023-02-15

## 2023-02-15 RX ORDER — SENNA PLUS 8.6 MG/1
2 TABLET ORAL
Qty: 0 | Refills: 0 | DISCHARGE
Start: 2023-02-15

## 2023-02-15 RX ORDER — GABAPENTIN 400 MG/1
1 CAPSULE ORAL
Qty: 0 | Refills: 0 | DISCHARGE
Start: 2023-02-15

## 2023-02-15 RX ORDER — POLYETHYLENE GLYCOL 3350 17 G/17G
17 POWDER, FOR SOLUTION ORAL
Qty: 0 | Refills: 0 | DISCHARGE
Start: 2023-02-15

## 2023-02-15 RX ORDER — PANTOPRAZOLE SODIUM 20 MG/1
1 TABLET, DELAYED RELEASE ORAL
Qty: 0 | Refills: 0 | DISCHARGE

## 2023-02-15 RX ORDER — PANTOPRAZOLE SODIUM 20 MG/1
1 TABLET, DELAYED RELEASE ORAL
Qty: 0 | Refills: 0 | DISCHARGE
Start: 2023-02-15

## 2023-02-15 RX ADMIN — GABAPENTIN 100 MILLIGRAM(S): 400 CAPSULE ORAL at 05:04

## 2023-02-15 RX ADMIN — PANTOPRAZOLE SODIUM 40 MILLIGRAM(S): 20 TABLET, DELAYED RELEASE ORAL at 05:03

## 2023-02-15 RX ADMIN — Medication 975 MILLIGRAM(S): at 05:03

## 2023-02-15 RX ADMIN — Medication 975 MILLIGRAM(S): at 14:00

## 2023-02-15 RX ADMIN — OXYCODONE HYDROCHLORIDE 10 MILLIGRAM(S): 5 TABLET ORAL at 14:00

## 2023-02-15 RX ADMIN — Medication 975 MILLIGRAM(S): at 06:00

## 2023-02-15 RX ADMIN — GABAPENTIN 100 MILLIGRAM(S): 400 CAPSULE ORAL at 13:00

## 2023-02-15 RX ADMIN — OXYCODONE HYDROCHLORIDE 10 MILLIGRAM(S): 5 TABLET ORAL at 13:00

## 2023-02-15 RX ADMIN — Medication 10 MILLIGRAM(S): at 16:27

## 2023-02-15 RX ADMIN — POLYETHYLENE GLYCOL 3350 17 GRAM(S): 17 POWDER, FOR SOLUTION ORAL at 12:13

## 2023-02-15 RX ADMIN — OXYCODONE HYDROCHLORIDE 5 MILLIGRAM(S): 5 TABLET ORAL at 18:27

## 2023-02-15 RX ADMIN — OXYCODONE HYDROCHLORIDE 10 MILLIGRAM(S): 5 TABLET ORAL at 06:00

## 2023-02-15 RX ADMIN — OXYCODONE HYDROCHLORIDE 10 MILLIGRAM(S): 5 TABLET ORAL at 05:04

## 2023-02-15 RX ADMIN — Medication 975 MILLIGRAM(S): at 13:00

## 2023-02-15 NOTE — DISCHARGE NOTE NURSING/CASE MANAGEMENT/SOCIAL WORK - PATIENT PORTAL LINK FT
You can access the FollowMyHealth Patient Portal offered by [facility] by registering at the following website: [email or web address]. By joining [facility]’s FollowMyHealth portal, you will also be able to view your health information using other applications (apps) compatible with our system.

## 2023-02-15 NOTE — PROGRESS NOTE ADULT - PROVIDER SPECIALTY LIST ADULT
Hospitalist
Hospitalist
Orthopedics
Hospitalist
Internal Medicine
Orthopedics

## 2023-02-15 NOTE — DISCHARGE NOTE NURSING/CASE MANAGEMENT/SOCIAL WORK - NSDPFAC_GEN_ALL_CORE
[facility], [place] [facility] [address], [place] [address]

## 2023-02-15 NOTE — PROGRESS NOTE ADULT - SUBJECTIVE AND OBJECTIVE BOX
[facility] Medicine  Pager [phone]    Patient is a 58y old  Female who presents with a chief complaint of L3-5 lami/PSF (14 Feb 2023 09:23)      INTERVAL HPI/OVERNIGHT EVENTS:    MEDICATIONS  (STANDING):  acetaminophen     Tablet .. 975 milliGRAM(s) Oral every 8 hours  gabapentin 100 milliGRAM(s) Oral three times a day  losartan 100 milliGRAM(s) Oral daily  melatonin 3 milliGRAM(s) Oral at bedtime  melatonin 3 milliGRAM(s) Oral once  pantoprazole    Tablet 40 milliGRAM(s) Oral before breakfast  polyethylene glycol 3350 17 Gram(s) Oral daily  senna 2 Tablet(s) Oral at bedtime    MEDICATIONS  (PRN):  bisacodyl Suppository 10 milliGRAM(s) Rectal daily PRN Constipation  HYDROmorphone  Injectable 0.5 milliGRAM(s) IV Push every 8 hours PRN breakthrough pain  oxyCODONE    IR 5 milliGRAM(s) Oral every 4 hours PRN Moderate Pain (4 - 6)  oxyCODONE    IR 10 milliGRAM(s) Oral every 4 hours PRN Severe Pain (7 - 10)  traMADol 50 milliGRAM(s) Oral every 6 hours PRN Mild Pain (1 - 3)      Allergies    No Known Allergies    Intolerances        REVIEW OF SYSTEMS:  Please see interval HPI:    Vital Signs Last 24 Hrs  T(C): 37.1 (15 Feb 2023 09:35), Max: 37.2 (14 Feb 2023 17:28)  T(F): 98.7 (15 Feb 2023 09:35), Max: 98.9 (14 Feb 2023 17:28)  HR: 85 (15 Feb 2023 09:35) (73 - 85)  BP: 136/66 (15 Feb 2023 09:35) (109/56 - 136/66)  BP(mean): --  RR: 18 (15 Feb 2023 09:35) (16 - 18)  SpO2: 100% (15 Feb 2023 09:35) (100% - 100%)    Parameters below as of 15 Feb 2023 09:35  Patient On (Oxygen Delivery Method): room air      I&O's Detail    14 Feb 2023 07:01  -  15 Feb 2023 07:00  --------------------------------------------------------  IN:    Oral Fluid: 150 mL  Total IN: 150 mL    OUT:    Bulb (mL): 20 mL    Bulb (mL): 52.5 mL    Voided (mL): 1100 mL    Voided (mL): 3400 mL  Total OUT: 4572.5 mL    Total NET: -4422.5 mL      15 Feb 2023 07:01  -  15 Feb 2023 11:37  --------------------------------------------------------  IN:  Total IN: 0 mL    OUT:    Voided (mL): 500 mL  Total OUT: 500 mL    Total NET: -500 mL            PHYSICAL EXAM:  GENERAL:   HEAD:    EYES:   ENMT:   NECK:   NERVOUS SYSTEM:    CHEST/LUNG:   HEART:   ABDOMEN:   EXTREMITIES:    LYMPH:   SKIN:     LABS:      Ca    9.5        14 Feb 2023 05:30        CAPILLARY BLOOD GLUCOSE        BLOOD CULTURE    RADIOLOGY & ADDITIONAL TESTS:    Imaging Personally Reviewed:  [ ] YES     Consultant(s) Notes Reviewed:      Care Discussed with Consultants/Other Providers: [facility] Medicine  Pager [phone]    Patient is a 58y old  Female who presents with a chief complaint of L3-5 lami/PSF (14 Feb 2023 09:23)      INTERVAL HPI/OVERNIGHT EVENTS:  Sitting in chair, still with discomfort, but going to work w/ physical therapy. Being in bed is uncomfortable. Reports no BM yet, discussed potential use of prn suppository that is available, patient states she will let RN know if she wants to try it.     MEDICATIONS  (STANDING):  acetaminophen     Tablet .. 975 milliGRAM(s) Oral every 8 hours  gabapentin 100 milliGRAM(s) Oral three times a day  losartan 100 milliGRAM(s) Oral daily  melatonin 3 milliGRAM(s) Oral at bedtime  melatonin 3 milliGRAM(s) Oral once  pantoprazole    Tablet 40 milliGRAM(s) Oral before breakfast  polyethylene glycol 3350 17 Gram(s) Oral daily  senna 2 Tablet(s) Oral at bedtime    MEDICATIONS  (PRN):  bisacodyl Suppository 10 milliGRAM(s) Rectal daily PRN Constipation  HYDROmorphone  Injectable 0.5 milliGRAM(s) IV Push every 8 hours PRN breakthrough pain  oxyCODONE    IR 5 milliGRAM(s) Oral every 4 hours PRN Moderate Pain (4 - 6)  oxyCODONE    IR 10 milliGRAM(s) Oral every 4 hours PRN Severe Pain (7 - 10)  traMADol 50 milliGRAM(s) Oral every 6 hours PRN Mild Pain (1 - 3)    Allergies  No Known Allergies    Intolerances    REVIEW OF SYSTEMS:  Please see interval HPI:    Vital Signs Last 24 Hrs  T(C): 37.1 (15 Feb 2023 09:35), Max: 37.2 (14 Feb 2023 17:28)  T(F): 98.7 (15 Feb 2023 09:35), Max: 98.9 (14 Feb 2023 17:28)  HR: 85 (15 Feb 2023 09:35) (73 - 85)  BP: 136/66 (15 Feb 2023 09:35) (109/56 - 136/66)  BP(mean): --  RR: 18 (15 Feb 2023 09:35) (16 - 18)  SpO2: 100% (15 Feb 2023 09:35) (100% - 100%)    Parameters below as of 15 Feb 2023 09:35  Patient On (Oxygen Delivery Method): room air      I&O's Detail    14 Feb 2023 07:01  -  15 Feb 2023 07:00  --------------------------------------------------------  IN:    Oral Fluid: 150 mL  Total IN: 150 mL    OUT:    Bulb (mL): 20 mL    Bulb (mL): 52.5 mL    Voided (mL): 1100 mL    Voided (mL): 3400 mL  Total OUT: 4572.5 mL    Total NET: -4422.5 mL      15 Feb 2023 07:01  -  15 Feb 2023 11:37  --------------------------------------------------------  IN:  Total IN: 0 mL    OUT:    Voided (mL): 500 mL  Total OUT: 500 mL    Total NET: -500 mL    PHYSICAL EXAM:  GENERAL: sitting in chair, appears more comfortable today  HEAD:  NC/AT  EYES: EOMI, clear sclera/conjunctiva  ENMT: MMM, hearing intact to voice  NECK: supple, no JVD  NERVOUS SYSTEM: moving extremities, SILT grossly  CHEST/LUNG:  no wheeze appreciated, comfortable on RA, speaking in full sentences  EXTREMITIES:  no c/c/e  SKIN: +drain w/ serosanguinous output      LABS:                        10.2   8.79  )-----------( 162      ( 14 Feb 2023 05:30 )             31.6   02-14    134<L>  |  100  |  13  ----------------------------<  125<H>  4.0   |  23  |  0.66    Ca    9.5      14 Feb 2023 05:30      CAPILLARY BLOOD GLUCOSE    BLOOD CULTURE    RADIOLOGY & ADDITIONAL TESTS:    Imaging Personally Reviewed:  [ ] YES     Consultant(s) Notes Reviewed:  Ortho    Care Discussed with Consultants/Other Providers: Ortho Yelena re: pending [facility] (monitoring drain output), titrating bowel regimen as needed

## 2023-02-15 NOTE — DISCHARGE NOTE NURSING/CASE MANAGEMENT/SOCIAL WORK - NSDCPNDISPN_GEN_ALL_CORE
Side effects of pain management treatment/Activities of daily living, including home environment that might     exacerbate pain or reduce effectiveness of the pain management plan of care as well as strategies to address these issues Education provided on the pain management plan of care/Side effects of pain management treatment/Activities of daily living, including home environment that might     exacerbate pain or reduce effectiveness of the pain management plan of care as well as strategies to address these issues

## 2023-02-15 NOTE — DISCHARGE NOTE NURSING/CASE MANAGEMENT/SOCIAL WORK - NSDCPECAREGIVERED_GEN_ALL_CORE
carenotes on spinal fusion, pain after surgery, side effects handout, incision action plan, carenotes on d/c medications

## 2023-02-15 NOTE — PROGRESS NOTE ADULT - ASSESSMENT
57 y/o F with pmhx of GERD, chronic LBP, neuropathy, HTN who presents with worsening lower back pain. Plan for OR with Dr. Funk for L3-L5 laminectomy and fusion.       #Lower Back pain   - worsening back pain for over 7 months, dicussed with ortho team patient had outpatient imaging done and was told to come to ED for possible surgery (not available in All[facility] or HIE per my review)   - s/p OR on 2/11 for L3-L5 laminectomy and fusion, tolerated procedure   - No BM yet, no flatus   - PT rec rehab     #Anemia  - Hgb 9.5 this AM, likely 2/2 to post surgical changes, no overt sings of bleeding   - Hemodynamically stable   - will continue to monitor, transfuse for Hgb<7      #Neuropathy   - c/w gabapentin 100mg TID for neuropathic pain      #HTN  - BP within good range  - c/w losartan 100mg qd     DVT ppx: SCDs      #GERD  - c/w pantoprazole 
Pt is a  57 y/o  Female  s/p L3-5 lami/fusion DOS 2/11/2023    -    Multimodal Pain control  -    Mechanical DVT ppx  -    Resumed home meds  -    Check AM labs  -    Monitor CHACHO output, will discontinue drain when output <50cc over 24 hours  -    Weight bearing status: WBAT  -    PT/OT  -    FU LSO brace  -    Dispo planning
Pt is a  59 y/o  Female  s/p L3-5 lami/fusion DOS 2/11/2023    -    Multimodal Pain control  -    Mechanical DVT ppx  -    Monitor CHACHO output, will discontinue drain when output <50cc over 24 hours  -    Adjust bowel regimen monitor for BM    -    Weight bearing status: WBAT  -    PT/OT  -    Dispo planning: [facility] pending placement  
57 yo F w/ HTN, GERD, chronic LBP with neuropathy, presenting w/ worsening back pain, s/p OR 2/11 for L3-L5 laminectomy and fusion.     # Lower back pain, neuropathy s/p spinal surgery  - POD# 2  - c/w pain management as per ortho (currently on tylenol, gabapentin, oxy IR, IV dilaudid prn), titrate regimen as needed  - encourage incentive spirometry  - consider addition of bowel regimen to prevent opioid induced constipation (miralax/senna)  - c/w PT    # Essential HTN  - c/w losartan, consider addition of hold parameters to BP med   - current BP in acceptable range    # GERD  - c/w PPI    VTE ppx: SCDs as per ortho    Dispo: PT recs [facility]
57 yo F w/ HTN, GERD, chronic LBP with neuropathy, presenting w/ worsening back pain, s/p OR 2/11 for L3-L5 laminectomy and fusion.     # Lower back pain, neuropathy s/p spinal surgery  - POD# 3  - c/w pain management as per ortho (currently on tylenol, gabapentin, with oxy IR, PO tramadol, IV dilaudid prn), titrate regimen as needed  - encourage incentive spirometry  - c/w bowel regimen to prevent opioid induced constipation (miralax/senna), titrate regimen as needed (can escalate to use of suppositories/enemas etc if no BMs)  - c/w PT    # Essential HTN  - c/w losartan, consider addition of hold parameters to BP med   - current BP in acceptable range    # GERD  - c/w PPI    VTE ppx: SCDs as per ortho    Dispo: PT recs [facility]  
59 yo F w/ HTN, GERD, chronic LBP with neuropathy, presenting w/ worsening back pain, s/p OR 2/11 for L3-L5 laminectomy and fusion.     # Lower back pain, neuropathy s/p spinal surgery  - POD# 4  - c/w pain management as per ortho (currently on tylenol, gabapentin, with oxy IR, PO tramadol, IV dilaudid prn), titrate regimen as needed  - encourage incentive spirometry  - c/w bowel regimen to prevent opioid induced constipation (miralax/senna), titrate regimen as needed (discussed w/ patient to let RN know if wishes to try prn dulcolax suppository that was ordered)  - c/w PT    # Essential HTN  - c/w losartan, consider addition of hold parameters to BP med   - current BP in acceptable range    # GERD  - c/w PPI    VTE ppx: SCDs as per ortho    Dispo: PT recs [facility]

## 2023-02-15 NOTE — DISCHARGE NOTE NURSING/CASE MANAGEMENT/SOCIAL WORK - NSDCPNINST_GEN_ALL_CORE
Notify Dr Funk if you experience any increase in pain not relieved with pain medication, any redness drainage or swelling Notify Dr Funk if you experience any increase in pain not relieved with pain medication, any redness drainage or swelling around incision or any fever >100.5.  Drink plenty of fluids.  No heavy lifting, straining, pushing or pulling.  Maintain proper body alignment            Notify Dr Carrillo     Notify Dr Funk if you experience any increase in pain not relieved with pain medication, any redness drainage or swelling around incision or any fever >100.5.  Drink plenty of fluids.  No heavy lifting, straining, pushing or pulling.  Maintain proper body alignment, no bending or twisting at the waist.  Use over the counter stool softeners to assist with constipation.              Notify Dr Carrillo

## 2023-02-15 NOTE — PROGRESS NOTE ADULT - SUBJECTIVE AND OBJECTIVE BOX
ORTHO PROGRESS NOTE     No acute overnight events. Pt resting comfortably without complaint. Pain controlled  Walking with Physical Therapy     Vital Signs Last 24 Hrs  T(C): 37.1 (15 Feb 2023 04:55), Max: 37.2 (14 Feb 2023 17:28)  T(F): 98.8 (15 Feb 2023 04:55), Max: 98.9 (14 Feb 2023 17:28)  HR: 73 (15 Feb 2023 04:55) (73 - 82)  BP: 109/56 (15 Feb 2023 04:55) (109/56 - 132/58)  BP(mean): --  RR: 17 (15 Feb 2023 04:55) (16 - 18)  SpO2: 100% (15 Feb 2023 04:55) (100% - 100%)    Parameters below as of 15 Feb 2023 04:55  Patient On (Oxygen Delivery Method): room air        Back: Dressing/ Incision Clean/Dry/Intact,  HV intact with SS output  Motor:                   C5                C6              C7               C8           T1   R            5/5                5/5            5/5             5/5          5/5  L             5/5               5/5             5/5             5/5          5/5                L2             L3             L4               L5            S1  R         5/5           5/5          5/5             5/5           5/5  L          5/5          5/5           5/5             5/5           5/5    Sensory:            C5         C6         C7      C8       T1        (0=absent, 1=impaired, 2=normal, NT=not testable)  R         2            2           2        2         2  L          2            2           2        2         2               L2          L3         L4      L5       S1         (0=absent, 1=impaired, 2=normal, NT=not testable)  R         2            2            2        2        2  L          2            2           2        2         2      Pt is a  59 y/o  Female  s/p L3-5 lami/fusion DOS 2/11/2023    -    Multimodal Pain control  -    Mechanical DVT ppx  -    Monitor CHACHO output, will discontinue drain when output <50cc over 24 hours  -    Adjust bowel regimen monitor for BM    -    Weight bearing status: WBAT  -    PT/OT  -    Dispo planning: [facility] pending placement

## 2024-02-07 NOTE — OCCUPATIONAL THERAPY INITIAL EVALUATION ADULT - LEVEL OF INDEPENDENCE: CHAIR TO BED, REHAB EVAL
To schedule a follow-up visit with the Integrated Pediatric Primary Care Psychology team at [facility], please call Rin Abbott: [phone].      Other helpful contacts & resources:    [facility]  [address], [place], [place] [address] [phone]  [email or web address]      [facility]:  [address], [place] [address]  phone: [phone]   [email or web address]             LOCAL THERAPY PARTNERS:    [facility]  [phone]  [address] [place] [address]  [email or web address]     (Additional locations in [facility] & [place])   In-network:   Blue Cross Blue Shield [employer] Medicaid [place] [facility]    Out-of-network:   Offers affordable sliding fee scale    After-hours and weekend appointments   Bilingual Danish-speaking providers on staff        [facility]  [phone]  [address] [place] [address] [address] [place] [address]  [email or web address]  In-network:  All Medicaid plans & [employer] (CS[employer])    Out-of-network:  Private insurance plans    In-home and school-based services  Open 9:30am-6:30pm       ADDITIONAL OPTIONS:    [facility] ([facility])  [phone]  [address] [place] [address]  [email or web address]  [phone]  [email or web address]   [place], [place], & [place]   [place] [facility]   [phone]  [address] [place] [address]   [email or web address]    [facility]  [phone]  [email or web address]   [facility]  [phone]  [facility] [address] [place] [address]  [email or web address]   [facility]  [facility]  [phone] [address] [place] [address]  [email or web address]         minimum assist (75% patients effort)

## (undated) DEVICE — POSITIONER STRAP ARMBOARD VELCRO TS-30

## (undated) DEVICE — TAP 4.5MM

## (undated) DEVICE — DRAPE 3/4 SHEET 52X76"

## (undated) DEVICE — SUT VICRYL 2-0 27" FS-1 UNDYED

## (undated) DEVICE — GLV 7.5 PROTEXIS (WHITE)

## (undated) DEVICE — Device

## (undated) DEVICE — BIPOLAR FORCEP STRYKER STANDARD 8" X 1MM (YELLOW)

## (undated) DEVICE — MIDAS REX LEGEND MATCH HEAD FLUTED LG BORE 3.0MM X 14CM

## (undated) DEVICE — DRAIN JACKSON PRATT 7MM FLAT FULL NO TROCAR

## (undated) DEVICE — LABELS BLANK W PEN

## (undated) DEVICE — GOWN SMARTGOWN RAGLAN XLG

## (undated) DEVICE — DRSG TELFA 3 X 8

## (undated) DEVICE — BLADE SCALPEL SAFETYLOCK #15

## (undated) DEVICE — MIDAS REX LEGEND LUBRICANT DIFFUSER CARTRIDGE

## (undated) DEVICE — LIJ-KMEDIC KERRISON RONGUER: Type: DURABLE MEDICAL EQUIPMENT

## (undated) DEVICE — MIDAS REX LEGEND BALL FLUTED SM BORE 4.0MM X 10CM

## (undated) DEVICE — ELCTR GROUNDING PAD ADULT COVIDIEN

## (undated) DEVICE — STRYKER BONE MILL BLADE FINE 3.2MM

## (undated) DEVICE — WARMING BLANKET FULL ADULT

## (undated) DEVICE — SOL IRR POUR H2O 500ML

## (undated) DEVICE — SUT SILK 2-0 18" FS

## (undated) DEVICE — SOL IRR BAG NS 0.9% 1000ML

## (undated) DEVICE — DRAPE INSTRUMENT POUCH 6.75" X 11"

## (undated) DEVICE — DRSG CURITY GAUZE SPONGE 4 X 4" 12-PLY

## (undated) DEVICE — DRAIN RESERVOIR FOR JACKSON PRATT 100CC CARDINAL

## (undated) DEVICE — DRAIN JACKSON PRATT 3 SPRING RESERVOIR W 10FR PVC DRAIN

## (undated) DEVICE — SUT VICRYL 2-0 18" CT-1 UNDYED (POP-OFF)

## (undated) DEVICE — DRAPE SURGICAL #1010

## (undated) DEVICE — PACK NEURO MINOR

## (undated) DEVICE — VENODYNE/SCD SLEEVE CALF MEDIUM

## (undated) DEVICE — DRSG TEGADERM 1.75X1.75"

## (undated) DEVICE — DRSG STERISTRIPS 0.5 X 4"

## (undated) DEVICE — TAPE SILK 3"

## (undated) DEVICE — DRAPE LAPAROTOMY W VELCRO CORD TABS

## (undated) DEVICE — GLV 7 PROTEXIS (WHITE)

## (undated) DEVICE — SUT VICRYL 1 36" CTX UNDYED

## (undated) DEVICE — NDL SPINAL 18G X 3.5" (PINK)

## (undated) DEVICE — DRSG BENZOIN 0.6CC

## (undated) DEVICE — MISONIX BONESCALPEL BLUNT BLADE & TUBESET 20MM

## (undated) DEVICE — FOLEY TRAY 16FR 5CC LF UMETER CLOSED

## (undated) DEVICE — SPONGE X-RAY 4X8"

## (undated) DEVICE — DRAPE SNAP KOVER 36X28

## (undated) DEVICE — ELCTR BOVIE PENCIL BLADE 10FT

## (undated) DEVICE — SUT VICRYL 0 18" CT-1 UNDYED (POP-OFF)

## (undated) DEVICE — DRSG TEGADERM 4X4.75"

## (undated) DEVICE — NDL HYPO SAFE 21G X 1.5" (GREEN)

## (undated) DEVICE — CANISTER DISPOSABLE THIN WALL 3000CC

## (undated) DEVICE — POSITIONER PATIENT SAFETY STRAP 3X60"

## (undated) DEVICE — PREP DURAPREP 26CC

## (undated) DEVICE — SUT MONOCRYL 3-0 27" PS-2 UNDYED

## (undated) DEVICE — PROTECTOR HEEL / ELBOW FLUFFY

## (undated) DEVICE — POSITIONER CUSHION INSERT PRONE VIEW LG

## (undated) DEVICE — SYR ASEPTO

## (undated) DEVICE — GOWN XL

## (undated) DEVICE — GLV 8 PROTEXIS (WHITE)

## (undated) DEVICE — SUT VICRYL 0 27" OS-6 UNDYED

## (undated) DEVICE — SUT VICRYL 1 18" CT-1 UNDYED (POP-OFF)

## (undated) DEVICE — BLADE SCALPEL SAFETYLOCK #11

## (undated) DEVICE — SPONGE DISSECTOR PEANUT

## (undated) DEVICE — DRAPE BACK TABLE COVER 44X90"

## (undated) DEVICE — SYR LUER LOK 20CC